# Patient Record
Sex: MALE | Race: WHITE | NOT HISPANIC OR LATINO
[De-identification: names, ages, dates, MRNs, and addresses within clinical notes are randomized per-mention and may not be internally consistent; named-entity substitution may affect disease eponyms.]

---

## 2018-04-10 ENCOUNTER — TRANSCRIPTION ENCOUNTER (OUTPATIENT)
Age: 38
End: 2018-04-10

## 2019-04-02 ENCOUNTER — APPOINTMENT (OUTPATIENT)
Dept: INTERNAL MEDICINE | Facility: CLINIC | Age: 39
End: 2019-04-02
Payer: COMMERCIAL

## 2019-04-02 ENCOUNTER — NON-APPOINTMENT (OUTPATIENT)
Age: 39
End: 2019-04-02

## 2019-04-02 VITALS
WEIGHT: 173 LBS | SYSTOLIC BLOOD PRESSURE: 140 MMHG | BODY MASS INDEX: 28.82 KG/M2 | HEIGHT: 65 IN | RESPIRATION RATE: 15 BRPM | TEMPERATURE: 98.1 F | OXYGEN SATURATION: 98 % | HEART RATE: 77 BPM | DIASTOLIC BLOOD PRESSURE: 80 MMHG

## 2019-04-02 DIAGNOSIS — F19.90 OTHER PSYCHOACTIVE SUBSTANCE USE, UNSPECIFIED, UNCOMPLICATED: ICD-10-CM

## 2019-04-02 DIAGNOSIS — Z80.1 FAMILY HISTORY OF MALIGNANT NEOPLASM OF TRACHEA, BRONCHUS AND LUNG: ICD-10-CM

## 2019-04-02 DIAGNOSIS — F10.10 ALCOHOL ABUSE, UNCOMPLICATED: ICD-10-CM

## 2019-04-02 DIAGNOSIS — F19.10 OTHER PSYCHOACTIVE SUBSTANCE ABUSE, UNCOMPLICATED: ICD-10-CM

## 2019-04-02 DIAGNOSIS — Z80.8 FAMILY HISTORY OF MALIGNANT NEOPLASM OF OTHER ORGANS OR SYSTEMS: ICD-10-CM

## 2019-04-02 DIAGNOSIS — Z80.0 FAMILY HISTORY OF MALIGNANT NEOPLASM OF DIGESTIVE ORGANS: ICD-10-CM

## 2019-04-02 DIAGNOSIS — Z82.49 FAMILY HISTORY OF ISCHEMIC HEART DISEASE AND OTHER DISEASES OF THE CIRCULATORY SYSTEM: ICD-10-CM

## 2019-04-02 PROCEDURE — 99385 PREV VISIT NEW AGE 18-39: CPT | Mod: 25

## 2019-04-02 PROCEDURE — 93000 ELECTROCARDIOGRAM COMPLETE: CPT

## 2019-04-02 PROCEDURE — 96127 BRIEF EMOTIONAL/BEHAV ASSMT: CPT

## 2019-04-02 PROCEDURE — 99214 OFFICE O/P EST MOD 30 MIN: CPT | Mod: 25

## 2019-04-06 PROBLEM — F10.10 ALCOHOL ABUSE: Status: RESOLVED | Noted: 2019-04-06 | Resolved: 2019-04-06

## 2019-04-06 PROBLEM — F19.90 ILLICIT DRUG USE: Status: RESOLVED | Noted: 2019-04-06 | Resolved: 2019-04-06

## 2019-04-06 NOTE — COUNSELING
[Weight management counseling provided] : Weight management [Healthy eating counseling provided] : healthy eating [Behavioral health counseling provided] : behavioral health  [Good understanding] : Patient has a good understanding of lifestyle changes and the steps needed to achieve self management goals [ - Annual Depression Screening] : Annual Depression Screening

## 2019-04-06 NOTE — HISTORY OF PRESENT ILLNESS
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  He states he has been clean for 9 years.  He states previously used opiates IV and drank alcohol excessively.  He still goes to \par \par Two weeks ago were mountain biking and thought he was having a heart attack.  he states he developed SOB and palpitations.  He states he thought he was going to pass out.  He states he did not seek medical attention at that time.  He states the next day he develops a little left chest pain.  He states he went to work in [facility].  He went to [facility] [place] [facility] as chest pain did not go away.  He states chest pain was like a tightness.  He states he thinks he got anxiety.  He states labs and EKG done at [facility]. He states he was told his cholesterol was high.  he states he was advised to see cardiologist.  He has appt to see cardiologist.  He states he feels better now.  he had lab results on his phone which he was showing me\par \par he also reports he has been having some left ear discomfort.  he states he has hx of recurrent ear infections and thinks he is getting one.  he denies fever/chills.  No sore throat, runny nose, or cough.  No hearing loss\par \par he also reports he has heartburn.  he is not taking anything for this

## 2019-04-06 NOTE — HEALTH RISK ASSESSMENT
[Intercurrent Urgi Care visits] : went to urgent care [0] : 2) Feeling down, depressed, or hopeless: Not at all (0) [HIV test declined] : HIV test declined [Employed] : employed [] :  [] : No [[id number]] : 0 [FreeTextEntry2] : -local 79

## 2019-04-06 NOTE — PHYSICAL EXAM
[No Acute Distress] : no acute distress [Well Nourished] : well nourished [Well Developed] : well developed [Well-Appearing] : well-appearing [Normal Voice/Communication] : normal voice/communication [Normal Sclera/Conjunctiva] : normal sclera/conjunctiva [PERRL] : pupils equal round and reactive to light [EOMI] : extraocular movements intact [Normal Outer Ear/Nose] : the outer ears and nose were normal in appearance [Normal Oropharynx] : the oropharynx was normal [Normal Nasal Mucosa] : the nasal mucosa was normal [No JVD] : no jugular venous distention [Supple] : supple [No Lymphadenopathy] : no lymphadenopathy [Thyroid Normal, No Nodules] : the thyroid was normal and there were no nodules present [No Respiratory Distress] : no respiratory distress  [Clear to Auscultation] : lungs were clear to auscultation bilaterally [No Accessory Muscle Use] : no accessory muscle use [Normal Rate] : normal rate  [Regular Rhythm] : with a regular rhythm [Normal S1, S2] : normal S1 and S2 [No Murmur] : no murmur heard [No Carotid Bruits] : no carotid bruits [No Edema] : there was no peripheral edema [No Extremity Clubbing/Cyanosis] : no extremity clubbing/cyanosis [Soft] : abdomen soft [Non Tender] : non-tender [Non-distended] : non-distended [No Masses] : no abdominal mass palpated [No HSM] : no HSM [Normal Bowel Sounds] : normal bowel sounds [No CVA Tenderness] : no CVA  tenderness [No Spinal Tenderness] : no spinal tenderness [No Joint Swelling] : no joint swelling [No Rash] : no rash [No Skin Lesions] : no skin lesions [No Focal Deficits] : no focal deficits [Normal Affect] : the affect was normal [Normal Mood] : the mood was normal [Normal Insight/Judgement] : insight and judgment were intact [de-identified] : left TM mildly erythematous, right TM intact

## 2019-04-06 NOTE — REVIEW OF SYSTEMS
[Anxiety] : anxiety [Negative] : Neurological [Fever] : no fever [Chills] : no chills [Fatigue] : no fatigue [Recent Change In Weight] : ~T no recent weight change [Earache] : no earache [Nasal Discharge] : no nasal discharge [Sore Throat] : no sore throat [Abdominal Pain] : no abdominal pain [Nausea] : no nausea [Diarrhea] : no diarrhea [Vomiting] : no vomiting [Suicidal] : not suicidal [Insomnia] : no insomnia [Depression] : no depression [FreeTextEntry5] : see HPI

## 2019-04-06 NOTE — ASSESSMENT
[FreeTextEntry1] : \par Chest pain/palpitations/SOB:\par -may be related to anxiety.  GERD could be contributing to chest pain as well\par -EKG today NSR at 68, no ST abnormalities\par -I have advised he see cardiology\par -will check CXR\par -if he develops chest pain or shortness of breath he should call 911 and go to ER\par \par Left otitis media:\par -will tx with Augmentin 875mg Po BID x 7 days\par -he is to notify office if sx persist or worsen\par \par GERD:\par -will check labs\par -sx are mild for now\par \par Dyslipidemia:\par -noted on labs here had done at urgent care-unclear if they were done fasting\par -chol 203 and trig 325\par -I advised low fat/low cholesterol diet and weight loss\par \par Elevated alk phos:\par -will repeat labs\par - with urgent care labs\par -he states this was a known finidng in past and worked up by previous PMD in [place]\par \par Hx of dug us including IVDA and alcohol abuse hx\par -he has been clean for 9 years\par -he continues to go to AA meeting\par \par Overweight:\par -BMI 28\par -I advised low fat/low cholesterol diet and weight loss\par -if sx persist may consider PPI\par \par Anxiety:\par -may be contributing to chest pain\par -tx options discussed\par -as sx mild will monitor for now\par -PHQ 9 score\par -if sx persist may consider referral to BH or therapist\par -no SI/HI\par -f/u 6 weeks\par \par Elevated BP:\par -I advised low fat/low cholesterol diet, low salt diet, and weight loss\par -f/u 6 weeks for BP check\par \par HCM\par \par CPE 2019\par \par Depression screenin2019 PHQ 2 score 0\par \par EKG 2019\par \par Flu shot:he declined\par \par Tdap: approx  per pt\par \par HIV testing: offered  2019 he declined\par \par Hepatitis C screening: ordered today\par \par Labs ordered-he will have done at lab\par \par Will try to get records and lab reports from his urgent care visit and will try to get records from previous PMD\par \par F/U 6 weeks\par \par \par

## 2019-05-10 LAB
25(OH)D3 SERPL-MCNC: 23.4 NG/ML
ANION GAP SERPL CALC-SCNC: 12 MMOL/L
APPEARANCE: CLEAR
BACTERIA: NEGATIVE
BILIRUBIN URINE: NEGATIVE
BLOOD URINE: NEGATIVE
BUN SERPL-MCNC: 17 MG/DL
CALCIUM SERPL-MCNC: 9.6 MG/DL
CHLORIDE SERPL-SCNC: 102 MMOL/L
CHOLEST SERPL-MCNC: 194 MG/DL
CHOLEST/HDLC SERPL: 3.5 RATIO
CO2 SERPL-SCNC: 26 MMOL/L
COLOR: NORMAL
CREAT SERPL-MCNC: 1.15 MG/DL
ERYTHROCYTE [SEDIMENTATION RATE] IN BLOOD BY WESTERGREN METHOD: 12 MM/HR
ESTIMATED AVERAGE GLUCOSE: 111 MG/DL
GGT SERPL-CCNC: 25 U/L
GLUCOSE QUALITATIVE U: NEGATIVE
GLUCOSE SERPL-MCNC: 93 MG/DL
H PYLORI AB SER-ACNC: 12.5 UNITS
H PYLORI IGA SER-ACNC: 29.6 UNITS
HBA1C MFR BLD HPLC: 5.5 %
HBV CORE IGG+IGM SER QL: NONREACTIVE
HBV SURFACE AB SER QL: NONREACTIVE
HBV SURFACE AG SER QL: NONREACTIVE
HCV AB SER QL: NONREACTIVE
HCV S/CO RATIO: 0.11 S/CO
HDLC SERPL-MCNC: 55 MG/DL
HYALINE CASTS: 0 /LPF
KETONES URINE: NEGATIVE
LDLC SERPL CALC-MCNC: 129 MG/DL
LEUKOCYTE ESTERASE URINE: NEGATIVE
MICROSCOPIC-UA: NORMAL
NITRITE URINE: NEGATIVE
PH URINE: 6
POTASSIUM SERPL-SCNC: 4.4 MMOL/L
PROTEIN URINE: NEGATIVE
RED BLOOD CELLS URINE: 1 /HPF
SODIUM SERPL-SCNC: 140 MMOL/L
SPECIFIC GRAVITY URINE: 1.01
SQUAMOUS EPITHELIAL CELLS: 0 /HPF
TRIGL SERPL-MCNC: 52 MG/DL
TSH SERPL-ACNC: 0.76 UIU/ML
UROBILINOGEN URINE: NORMAL
WHITE BLOOD CELLS URINE: 0 /HPF

## 2019-05-13 ENCOUNTER — FORM ENCOUNTER (OUTPATIENT)
Age: 39
End: 2019-05-13

## 2019-05-14 ENCOUNTER — OUTPATIENT (OUTPATIENT)
Dept: OUTPATIENT SERVICES | Facility: HOSPITAL | Age: 39
LOS: 1 days | End: 2019-05-14
Payer: MEDICARE

## 2019-05-14 ENCOUNTER — TRANSCRIPTION ENCOUNTER (OUTPATIENT)
Age: 39
End: 2019-05-14

## 2019-05-14 ENCOUNTER — APPOINTMENT (OUTPATIENT)
Dept: RADIOLOGY | Facility: CLINIC | Age: 39
End: 2019-05-14
Payer: COMMERCIAL

## 2019-05-14 DIAGNOSIS — R06.02 SHORTNESS OF BREATH: ICD-10-CM

## 2019-05-14 PROCEDURE — 71046 X-RAY EXAM CHEST 2 VIEWS: CPT | Mod: 26

## 2019-05-14 PROCEDURE — 71046 X-RAY EXAM CHEST 2 VIEWS: CPT

## 2019-05-15 ENCOUNTER — APPOINTMENT (OUTPATIENT)
Dept: INTERNAL MEDICINE | Facility: CLINIC | Age: 39
End: 2019-05-15
Payer: COMMERCIAL

## 2019-05-15 ENCOUNTER — RESULT CHARGE (OUTPATIENT)
Age: 39
End: 2019-05-15

## 2019-05-15 VITALS
WEIGHT: 169 LBS | HEIGHT: 65 IN | SYSTOLIC BLOOD PRESSURE: 118 MMHG | BODY MASS INDEX: 28.16 KG/M2 | OXYGEN SATURATION: 98 % | TEMPERATURE: 97.8 F | DIASTOLIC BLOOD PRESSURE: 80 MMHG | HEART RATE: 85 BPM | RESPIRATION RATE: 15 BRPM

## 2019-05-15 PROCEDURE — 99214 OFFICE O/P EST MOD 30 MIN: CPT

## 2019-05-15 RX ORDER — AMOXICILLIN AND CLAVULANATE POTASSIUM 875; 125 MG/1; MG/1
875-125 TABLET, COATED ORAL
Qty: 14 | Refills: 0 | Status: DISCONTINUED | COMMUNITY
Start: 2019-04-02 | End: 2019-05-15

## 2019-05-15 NOTE — ASSESSMENT
[FreeTextEntry1] : \par Chest pain/palpitations/SOB:\par -he remains asymptomatic\par -he was seen by cardiology and work up underway\par - CXR was normal \par \par GERD:\par -H pylori serology was positive\par -will order H. pylori breath test \par -not on any meds\par \par Dyslipidemia:\par -recent lipids reviewed with pt\par -I advised low fat/low cholesterol diet and weight loss\par \par Elevated alk phos:\par - with urgent care labs\par -GGT was normal\par -hepatitis B and C serologies were negative\par -he states this was a known finding in past and worked up by previous PMD in [place]\par -will repeat hepatic function panel and AP isoenzymes in 6 weeks-he will go to lab\par \par Overweight:\par -he has lost 4 lbs with diet modification\par -I advised low fat/low cholesterol diet and weight loss\par \par Anxiety:\par -he states sx have resolved and he feels well\par -f/u 6 weeks\par \par Vitamin D insufficiency:\par -I advised OTC vitamin D 3 1000 units daily\par \par HCM\par \par CPE 2019\par \par Depression screenin/15/2019 PHQ 2 score 0\par \par EKG 2019\par \par Flu shot: he declined previously\par \par Tdap: approx  per pt\par \par HIV testing: offered 2019 he declined\par \par Hepatitis C screenin2019 negative\par \par F/U 6 months.  he will have repeat labs done at lab in 6 weeks\par \par \par

## 2019-05-15 NOTE — REVIEW OF SYSTEMS
[Recent Change In Weight] : ~T recent weight change [Negative] : Integumentary [Chills] : no chills [Fever] : no fever [Fatigue] : no fatigue [Earache] : no earache [Nasal Discharge] : no nasal discharge [Sore Throat] : no sore throat [Chest Pain] : no chest pain [Palpitations] : no palpitations [Lower Ext Edema] : no lower extremity edema [Shortness Of Breath] : no shortness of breath [Wheezing] : no wheezing [Cough] : no cough [Dyspnea on Exertion] : not dyspnea on exertion [Abdominal Pain] : no abdominal pain [Nausea] : no nausea [Diarrhea] : no diarrhea [Vomiting] : no vomiting [Anxiety] : no anxiety [Depression] : no depression [FreeTextEntry2] : lost 4 Ibs

## 2019-05-15 NOTE — END OF VISIT
[FreeTextEntry3] : All medical entries made by the Scribe were at my, Dr. Tristan Caicedo's, direction and personally dictated by me on [5/15/19]. I have reviewed the chart and agree that the record accurately reflects my personal performance of the history, physical exam, assessment and plan. I have also personally directed, reviewed, and agreed with the chart.\par  [>50% of Time Spent on Counseling for ____] : Greater than 50% of the encounter time was spent on counseling for [unfilled] [Time Spent: ___ minutes] : I have spent [unfilled] minutes of face to face time with the patient

## 2019-05-15 NOTE — PHYSICAL EXAM
[No Acute Distress] : no acute distress [Well-Appearing] : well-appearing [Normal Sclera/Conjunctiva] : normal sclera/conjunctiva [Normal Voice/Communication] : normal voice/communication [PERRL] : pupils equal round and reactive to light [EOMI] : extraocular movements intact [Normal Oropharynx] : the oropharynx was normal [No JVD] : no jugular venous distention [Supple] : supple [No Lymphadenopathy] : no lymphadenopathy [Thyroid Normal, No Nodules] : the thyroid was normal and there were no nodules present [No Respiratory Distress] : no respiratory distress  [Clear to Auscultation] : lungs were clear to auscultation bilaterally [No Accessory Muscle Use] : no accessory muscle use [Regular Rhythm] : with a regular rhythm [Normal Rate] : normal rate  [Normal S1, S2] : normal S1 and S2 [No Murmur] : no murmur heard [No Extremity Clubbing/Cyanosis] : no extremity clubbing/cyanosis [No Edema] : there was no peripheral edema [Soft] : abdomen soft [Non Tender] : non-tender [No Masses] : no abdominal mass palpated [No HSM] : no HSM [Non-distended] : non-distended [Normal Bowel Sounds] : normal bowel sounds [No Rash] : no rash [No Focal Deficits] : no focal deficits [Normal Affect] : the affect was normal [Normal Mood] : the mood was normal [Normal Insight/Judgement] : insight and judgment were intact [Well Nourished] : well nourished [Well Developed] : well developed [Alert and Oriented x3] : oriented to person, place, and time

## 2019-05-15 NOTE — HISTORY OF PRESENT ILLNESS
[FreeTextEntry1] : Here for lab results [de-identified] : Here for lab results \par \par He states that his anxiety is well controlled. \par \par He was seen by cardiologist Dr. Toney and a stress test and echocardiogram were ordered.  He states he already did Holter\par \par He states that he has been watching his diet and has been eating much healthier and has lost 4 lbs

## 2019-05-15 NOTE — ADDENDUM
[FreeTextEntry1] : I, Lindsey Coffey, acted solely as a scribe for Dr. Caicedo on this date [5/15/19].\par

## 2019-11-13 ENCOUNTER — APPOINTMENT (OUTPATIENT)
Dept: INTERNAL MEDICINE | Facility: CLINIC | Age: 39
End: 2019-11-13

## 2019-12-10 ENCOUNTER — TRANSCRIPTION ENCOUNTER (OUTPATIENT)
Age: 39
End: 2019-12-10

## 2019-12-16 ENCOUNTER — NON-APPOINTMENT (OUTPATIENT)
Age: 39
End: 2019-12-16

## 2019-12-16 ENCOUNTER — APPOINTMENT (OUTPATIENT)
Dept: INTERNAL MEDICINE | Facility: CLINIC | Age: 39
End: 2019-12-16
Payer: COMMERCIAL

## 2019-12-16 VITALS
WEIGHT: 177 LBS | SYSTOLIC BLOOD PRESSURE: 123 MMHG | HEART RATE: 110 BPM | HEIGHT: 65 IN | TEMPERATURE: 97.6 F | BODY MASS INDEX: 29.49 KG/M2 | DIASTOLIC BLOOD PRESSURE: 74 MMHG | OXYGEN SATURATION: 98 %

## 2019-12-16 DIAGNOSIS — B02.9 ZOSTER W/OUT COMPLICATIONS: ICD-10-CM

## 2019-12-16 DIAGNOSIS — Z87.898 PERSONAL HISTORY OF OTHER SPECIFIED CONDITIONS: ICD-10-CM

## 2019-12-16 DIAGNOSIS — R03.0 ELEVATED BLOOD-PRESSURE READING, W/OUT DIAGNOSIS OF HYPERTENSION: ICD-10-CM

## 2019-12-16 PROCEDURE — 93000 ELECTROCARDIOGRAM COMPLETE: CPT

## 2019-12-16 PROCEDURE — 36415 COLL VENOUS BLD VENIPUNCTURE: CPT

## 2019-12-16 PROCEDURE — 99214 OFFICE O/P EST MOD 30 MIN: CPT | Mod: 25

## 2019-12-19 PROBLEM — B02.9 SHINGLES: Status: ACTIVE | Noted: 2019-12-16

## 2019-12-19 PROBLEM — Z87.898 HISTORY OF CHEST PAIN: Status: RESOLVED | Noted: 2019-04-02 | Resolved: 2019-12-19

## 2019-12-19 PROBLEM — R03.0 ELEVATED BLOOD PRESSURE READING: Status: RESOLVED | Noted: 2019-04-02 | Resolved: 2019-12-19

## 2019-12-19 PROBLEM — Z87.898 HISTORY OF SHORTNESS OF BREATH: Status: RESOLVED | Noted: 2019-04-02 | Resolved: 2019-12-19

## 2019-12-19 PROBLEM — Z87.898 HISTORY OF PALPITATIONS: Status: RESOLVED | Noted: 2019-04-02 | Resolved: 2019-12-19

## 2019-12-19 NOTE — REVIEW OF SYSTEMS
[Negative] : Psychiatric [Fever] : no fever [Chills] : no chills [Fatigue] : no fatigue [Chest Pain] : no chest pain [Lower Ext Edema] : no lower extremity edema [Palpitations] : no palpitations [Wheezing] : no wheezing [Cough] : no cough [Shortness Of Breath] : no shortness of breath [Dyspnea on Exertion] : not dyspnea on exertion [Abdominal Pain] : no abdominal pain [Nausea] : no nausea [Diarrhea] : no diarrhea [Vomiting] : no vomiting [de-identified] : see HPI [de-identified] : See HPI

## 2019-12-19 NOTE — HISTORY OF PRESENT ILLNESS
[FreeTextEntry8] : He states that he was dx with shingles at urgent care on 12/9/2019. He was treated with valacyclovir x7 days. He states that he began having  symptoms on  thanksgiving day. That following Friday he developed a rash around his right rib and flank area. He went to an urgent care in the city and was told it was nothing. But as sx persisted, he went to a different urgent care the following week and was dx with shingles. He states that medication made him feel dizzy but currently denies feeling dizzy. He also notes that symptoms have significantly improved.

## 2019-12-19 NOTE — END OF VISIT
[FreeTextEntry3] : All medical entries made by the Scribe were at my, Dr. Tristan Caicedo's, direction and personally dictated by me on [12/16/2019]. I have reviewed the chart and agree that the record accurately reflects my personal performance of the history, physical exam, assessment and plan. I have also personally directed, reviewed, and agreed with the chart.\par

## 2019-12-19 NOTE — PHYSICAL EXAM
[EOMI] : extraocular movements intact [No JVD] : no jugular venous distention [Thyroid Normal, No Nodules] : the thyroid was normal and there were no nodules present [No Lymphadenopathy] : no lymphadenopathy [No Respiratory Distress] : no respiratory distress  [Supple] : supple [No Accessory Muscle Use] : no accessory muscle use [Normal Rate] : normal rate  [Clear to Auscultation] : lungs were clear to auscultation bilaterally [No Murmur] : no murmur heard [Regular Rhythm] : with a regular rhythm [Normal S1, S2] : normal S1 and S2 [Well Developed] : well developed [Well Nourished] : well nourished [No Acute Distress] : no acute distress [Normal Voice/Communication] : normal voice/communication [Normal Sclera/Conjunctiva] : normal sclera/conjunctiva [Well-Appearing] : well-appearing [Normal Oropharynx] : the oropharynx was normal [PERRL] : pupils equal round and reactive to light [Normal] : no respiratory distress, lungs were clear to auscultation bilaterally and no accessory muscle use [Soft] : abdomen soft [No Extremity Clubbing/Cyanosis] : no extremity clubbing/cyanosis [No Edema] : there was no peripheral edema [No Masses] : no abdominal mass palpated [Non-distended] : non-distended [Non Tender] : non-tender [No HSM] : no HSM [Normal Bowel Sounds] : normal bowel sounds [No Focal Deficits] : no focal deficits [Normal Affect] : the affect was normal [Normal Mood] : the mood was normal [Normal Insight/Judgement] : insight and judgment were intact [Alert and Oriented x3] : oriented to person, place, and time [No CVA Tenderness] : no CVA  tenderness [No Rash] : no rash [No Spinal Tenderness] : no spinal tenderness [No Skin Lesions] : no skin lesions

## 2019-12-19 NOTE — ADDENDUM
[FreeTextEntry1] : I, Lindsey Coffey, acted solely as a scribe for Dr. Caicedo on this date [12/16/2019].\par

## 2019-12-19 NOTE — ASSESSMENT
[FreeTextEntry1] : \par Shingles:\par -s/o course of valtrex\par -overall feeling better\par -rash has resolved \par -pain  has mostly resolved as well\par \par Dizziness:\par -asymptomatic currently\par -EKG today showed NSR at 98, LAE, no ST abnormalities\par -will check labs\par \par Chest pain/palpitations/SOB:\par -he remains asymptomatic\par -he states work up with cardiologist was negative\par \par GERD:\par -H pylori serology was positive\par -will order H. pylori breath test again \par -not on any meds\par \par Dyslipidemia:\par -I advised low fat/low cholesterol diet and weight loss\par \par Elevated alk phos:\par -GGT was normal\par -hepatitis B and C serologies were negative\par -he states this was a known finding in past and worked up by previous PMD in [place]\par -will repeat hepatic function panel and AP isoenzymes in 6 weeks\par \par Vitamin D insufficiency:\par -OTC vitamin D 3 1000 units zuly\par -will check vitamin D 25-OHly\par \par HCM\par \par CPE 2019\par \par Depression screenin/15/2019 PHQ 2 score 0\par \par EKG 2019\par \par Flu shot: he declined 2019\par \par Tdap: approx  per pt\par \par HIV testing: offered 2019 he declined\par \par Hepatitis C screenin2019 negative\par \par F/U 3 months.  Labs drawn in office today.\par \par \par \par

## 2019-12-22 LAB
25(OH)D3 SERPL-MCNC: 26.2 NG/ML
ALBUMIN SERPL ELPH-MCNC: 4.4 G/DL
ALKPISO INTERP: NORMAL
ALP BLD-CCNC: 219 U/L
ALP BLD-CCNC: 220 U/L
ALP BONE CFR SERPL: 35 %
ALP INTEST CFR SERPL: 45 %
ALP LIVER CFR SERPL: 20 %
ALP MACRO CFR SERPL: 0 %
ALP PLAC CFR SERPL: 0 %
ALT SERPL-CCNC: 31 U/L
ANION GAP SERPL CALC-SCNC: 11 MMOL/L
AST SERPL-CCNC: 24 U/L
BASOPHILS # BLD AUTO: 0.03 K/UL
BASOPHILS NFR BLD AUTO: 0.5 %
BILIRUB DIRECT SERPL-MCNC: 0.1 MG/DL
BILIRUB INDIRECT SERPL-MCNC: 0.2 MG/DL
BILIRUB SERPL-MCNC: 0.2 MG/DL
BUN SERPL-MCNC: 22 MG/DL
CALCIUM SERPL-MCNC: 9.5 MG/DL
CHLORIDE SERPL-SCNC: 103 MMOL/L
CO2 SERPL-SCNC: 25 MMOL/L
CREAT SERPL-MCNC: 1.3 MG/DL
EOSINOPHIL # BLD AUTO: 0.1 K/UL
EOSINOPHIL NFR BLD AUTO: 1.6 %
GLUCOSE SERPL-MCNC: 100 MG/DL
HCT VFR BLD CALC: 45.8 %
HGB BLD-MCNC: 15.6 G/DL
IMM GRANULOCYTES NFR BLD AUTO: 0.2 %
LYMPHOCYTES # BLD AUTO: 2.31 K/UL
LYMPHOCYTES NFR BLD AUTO: 36.6 %
MAN DIFF?: NORMAL
MCHC RBC-ENTMCNC: 30.2 PG
MCHC RBC-ENTMCNC: 34.1 GM/DL
MCV RBC AUTO: 88.8 FL
MONOCYTES # BLD AUTO: 0.45 K/UL
MONOCYTES NFR BLD AUTO: 7.1 %
NEUTROPHILS # BLD AUTO: 3.41 K/UL
NEUTROPHILS NFR BLD AUTO: 54 %
PLATELET # BLD AUTO: 189 K/UL
POTASSIUM SERPL-SCNC: 4.6 MMOL/L
PROT SERPL-MCNC: 6.9 G/DL
RBC # BLD: 5.16 M/UL
RBC # FLD: 12.3 %
SODIUM SERPL-SCNC: 139 MMOL/L
WBC # FLD AUTO: 6.31 K/UL

## 2019-12-27 ENCOUNTER — TRANSCRIPTION ENCOUNTER (OUTPATIENT)
Age: 39
End: 2019-12-27

## 2019-12-28 ENCOUNTER — EMERGENCY (EMERGENCY)
Facility: HOSPITAL | Age: 39
LOS: 1 days | Discharge: HOME | End: 2019-12-28
Attending: EMERGENCY MEDICINE
Payer: COMMERCIAL

## 2019-12-28 VITALS
SYSTOLIC BLOOD PRESSURE: 200 MMHG | TEMPERATURE: 98 F | DIASTOLIC BLOOD PRESSURE: 120 MMHG | RESPIRATION RATE: 20 BRPM | OXYGEN SATURATION: 98 % | HEART RATE: 102 BPM | WEIGHT: 154.98 LBS | HEIGHT: 67 IN

## 2019-12-28 VITALS
OXYGEN SATURATION: 98 % | HEART RATE: 99 BPM | SYSTOLIC BLOOD PRESSURE: 138 MMHG | DIASTOLIC BLOOD PRESSURE: 91 MMHG | RESPIRATION RATE: 16 BRPM

## 2019-12-28 LAB
BASOPHILS # BLD AUTO: 0.03 K/UL — SIGNIFICANT CHANGE UP (ref 0–0.2)
BASOPHILS NFR BLD AUTO: 0.3 % — SIGNIFICANT CHANGE UP (ref 0–2)
D DIMER BLD IA.RAPID-MCNC: <150 NG/ML DDU — SIGNIFICANT CHANGE UP
EOSINOPHIL # BLD AUTO: 0.06 K/UL — SIGNIFICANT CHANGE UP (ref 0–0.5)
EOSINOPHIL NFR BLD AUTO: 0.6 % — SIGNIFICANT CHANGE UP (ref 0–6)
HCT VFR BLD CALC: 44.2 % — SIGNIFICANT CHANGE UP (ref 39–50)
HGB BLD-MCNC: 15.1 G/DL — SIGNIFICANT CHANGE UP (ref 13–17)
IMM GRANULOCYTES NFR BLD AUTO: 0.2 % — SIGNIFICANT CHANGE UP (ref 0–1.5)
LYMPHOCYTES # BLD AUTO: 2.1 K/UL — SIGNIFICANT CHANGE UP (ref 1–3.3)
LYMPHOCYTES # BLD AUTO: 21.8 % — SIGNIFICANT CHANGE UP (ref 13–44)
MCHC RBC-ENTMCNC: 29.9 PG — SIGNIFICANT CHANGE UP (ref 27–34)
MCHC RBC-ENTMCNC: 34.2 GM/DL — SIGNIFICANT CHANGE UP (ref 32–36)
MCV RBC AUTO: 87.5 FL — SIGNIFICANT CHANGE UP (ref 80–100)
MONOCYTES # BLD AUTO: 0.61 K/UL — SIGNIFICANT CHANGE UP (ref 0–0.9)
MONOCYTES NFR BLD AUTO: 6.3 % — SIGNIFICANT CHANGE UP (ref 2–14)
NEUTROPHILS # BLD AUTO: 6.81 K/UL — SIGNIFICANT CHANGE UP (ref 1.8–7.4)
NEUTROPHILS NFR BLD AUTO: 70.8 % — SIGNIFICANT CHANGE UP (ref 43–77)
PLATELET # BLD AUTO: 200 K/UL — SIGNIFICANT CHANGE UP (ref 150–400)
RBC # BLD: 5.05 M/UL — SIGNIFICANT CHANGE UP (ref 4.2–5.8)
RBC # FLD: 12.4 % — SIGNIFICANT CHANGE UP (ref 10.3–14.5)
WBC # BLD: 9.63 K/UL — SIGNIFICANT CHANGE UP (ref 3.8–10.5)
WBC # FLD AUTO: 9.63 K/UL — SIGNIFICANT CHANGE UP (ref 3.8–10.5)

## 2019-12-28 PROCEDURE — 99284 EMERGENCY DEPT VISIT MOD MDM: CPT

## 2019-12-28 RX ORDER — SODIUM CHLORIDE 9 MG/ML
1000 INJECTION INTRAMUSCULAR; INTRAVENOUS; SUBCUTANEOUS ONCE
Refills: 0 | Status: COMPLETED | OUTPATIENT
Start: 2019-12-28 | End: 2019-12-28

## 2019-12-28 RX ORDER — HYDROXYZINE HCL 10 MG
25 TABLET ORAL ONCE
Refills: 0 | Status: COMPLETED | OUTPATIENT
Start: 2019-12-28 | End: 2019-12-28

## 2019-12-28 RX ADMIN — Medication 25 MILLIGRAM(S): at 23:34

## 2019-12-28 RX ADMIN — SODIUM CHLORIDE 1000 MILLILITER(S): 9 INJECTION INTRAMUSCULAR; INTRAVENOUS; SUBCUTANEOUS at 23:40

## 2019-12-28 NOTE — ED ADULT TRIAGE NOTE - CHIEF COMPLAINT QUOTE
went to bed with odd feeling in chest. awoke with palpitations and dizziness. as per EMS found to be in new onset a-fib

## 2019-12-28 NOTE — ED PROVIDER NOTE - CLINICAL SUMMARY MEDICAL DECISION MAKING FREE TEXT BOX
Pt has remained NSR in 80s to 90s here. he was noted to have an irregaulr and rapid heart rate by EMS, however upon arrival and on Eklg pt just had a marked sinus arrhyhtmia, which is likely what they observed. he is well appearing and has good cardio f/u at [facility]

## 2019-12-28 NOTE — ED PROVIDER NOTE - PATIENT PORTAL LINK FT
You can access the FollowMyHealth Patient Portal offered by [facility] by registering at the following website: [email or web address]. By joining [facility]’s FollowMyHealth portal, you will also be able to view your health information using other applications (apps) compatible with our system.

## 2019-12-28 NOTE — ED PROVIDER NOTE - OBJECTIVE STATEMENT
38 y/o M, with Hx of anxiety, presents to the ED c/o rapid palpitations that began tonight. Pt reports that he was sleeping when he woke up abruptly and felt palpitations, stating that is heart was "going crazy" and "getting crazier" despite pt trying to relax. Pt also reports chest tightness, but denies CP. No further acute complaints at this time.

## 2019-12-29 LAB
ALBUMIN SERPL ELPH-MCNC: 4 G/DL — SIGNIFICANT CHANGE UP (ref 3.3–5.2)
ALP SERPL-CCNC: 199 U/L — HIGH (ref 40–120)
ALT FLD-CCNC: 27 U/L — SIGNIFICANT CHANGE UP
ANION GAP SERPL CALC-SCNC: 11 MMOL/L — SIGNIFICANT CHANGE UP (ref 5–17)
AST SERPL-CCNC: 22 U/L — SIGNIFICANT CHANGE UP
BILIRUB SERPL-MCNC: 0.2 MG/DL — LOW (ref 0.4–2)
BUN SERPL-MCNC: 25 MG/DL — HIGH (ref 8–20)
CALCIUM SERPL-MCNC: 8.8 MG/DL — SIGNIFICANT CHANGE UP (ref 8.6–10.2)
CHLORIDE SERPL-SCNC: 105 MMOL/L — SIGNIFICANT CHANGE UP (ref 98–107)
CO2 SERPL-SCNC: 23 MMOL/L — SIGNIFICANT CHANGE UP (ref 22–29)
CREAT SERPL-MCNC: 0.88 MG/DL — SIGNIFICANT CHANGE UP (ref 0.5–1.3)
GLUCOSE SERPL-MCNC: 119 MG/DL — HIGH (ref 70–115)
MAGNESIUM SERPL-MCNC: 1.8 MG/DL — SIGNIFICANT CHANGE UP (ref 1.6–2.6)
POTASSIUM SERPL-MCNC: 4.1 MMOL/L — SIGNIFICANT CHANGE UP (ref 3.5–5.3)
POTASSIUM SERPL-SCNC: 4.1 MMOL/L — SIGNIFICANT CHANGE UP (ref 3.5–5.3)
PROT SERPL-MCNC: 6.3 G/DL — LOW (ref 6.6–8.7)
SODIUM SERPL-SCNC: 139 MMOL/L — SIGNIFICANT CHANGE UP (ref 135–145)
T4 AB SER-ACNC: 6 UG/DL — SIGNIFICANT CHANGE UP (ref 4.5–12)
TROPONIN T SERPL-MCNC: <0.01 NG/ML — SIGNIFICANT CHANGE UP (ref 0–0.06)
TSH SERPL-MCNC: 1.1 UIU/ML — SIGNIFICANT CHANGE UP (ref 0.27–4.2)

## 2019-12-29 PROCEDURE — 84436 ASSAY OF TOTAL THYROXINE: CPT

## 2019-12-29 PROCEDURE — 85379 FIBRIN DEGRADATION QUANT: CPT

## 2019-12-29 PROCEDURE — 80053 COMPREHEN METABOLIC PANEL: CPT

## 2019-12-29 PROCEDURE — 84484 ASSAY OF TROPONIN QUANT: CPT

## 2019-12-29 PROCEDURE — 99283 EMERGENCY DEPT VISIT LOW MDM: CPT

## 2019-12-29 PROCEDURE — 85027 COMPLETE CBC AUTOMATED: CPT

## 2019-12-29 PROCEDURE — 83735 ASSAY OF MAGNESIUM: CPT

## 2019-12-29 PROCEDURE — 36415 COLL VENOUS BLD VENIPUNCTURE: CPT

## 2019-12-29 PROCEDURE — 84443 ASSAY THYROID STIM HORMONE: CPT

## 2019-12-29 RX ORDER — HYDROXYZINE HCL 10 MG
1 TABLET ORAL
Qty: 3 | Refills: 0
Start: 2019-12-29 | End: 2019-12-29

## 2019-12-30 ENCOUNTER — NON-APPOINTMENT (OUTPATIENT)
Age: 39
End: 2019-12-30

## 2019-12-30 ENCOUNTER — APPOINTMENT (OUTPATIENT)
Dept: INTERNAL MEDICINE | Facility: CLINIC | Age: 39
End: 2019-12-30
Payer: COMMERCIAL

## 2019-12-30 VITALS
BODY MASS INDEX: 29.16 KG/M2 | SYSTOLIC BLOOD PRESSURE: 128 MMHG | DIASTOLIC BLOOD PRESSURE: 88 MMHG | HEART RATE: 61 BPM | RESPIRATION RATE: 15 BRPM | WEIGHT: 175 LBS | OXYGEN SATURATION: 98 % | TEMPERATURE: 97.7 F | HEIGHT: 65 IN

## 2019-12-30 DIAGNOSIS — Z86.69 PERSONAL HISTORY OF OTHER DISEASES OF THE NERVOUS SYSTEM AND SENSE ORGANS: ICD-10-CM

## 2019-12-30 PROCEDURE — 99214 OFFICE O/P EST MOD 30 MIN: CPT | Mod: 25

## 2019-12-30 PROCEDURE — 93000 ELECTROCARDIOGRAM COMPLETE: CPT

## 2020-01-01 NOTE — ASSESSMENT
[FreeTextEntry1] : \par Otitis media:\par -will tx with Augmentin 875mg PO BID x 7 days\par -he has appt to see ENT today\par \par Atrial fibrillation:\par -labs in ER done including normal TFTs\par -appears oral steroids may have caused atrial fibrillation\par -he is currently asymptomatic\par -EKG today NSR at 98, no ST abnormalities\par -he was not discharged on any meds\par -I have advised he start ASA 81mg PO daily pendng cardiology evaluation\par -I advised he make appt to see cardiology as soon as possible\par -he was advised to go back to ER if he develops palpitations, chest pain, SOB, or dizziness\par \par Shingles:\par -sx have resolved\par \par GERD:\par -H pylori serology was positive\par -will order H. pylori breath test has been ordered\par -not on any meds\par \par Dyslipidemia:\par -I advised low fat/low cholesterol diet and weight loss\par \par Elevated alk phos:\par AP in \par -GGT was normal\par -hepatitis B and C serologies were negative\par -he states this was a known finding in past and worked up by previous PMD in [place]\par -I have ordered an abdominal US\par -I have also advised he see GI\par \par Vitamin D insufficiency:\par -OTC vitamin D 3 1000 units daily\par \par HCM\par \par CPE 2019\par \par Depression screenin/15/2019 PHQ 2 score 0\par \par EKG 2019\par \par Flu shot: he declined 2019\par \par Tdap: approx  per pt\par \par HIV testing: offered 2019 he declined\par \par Hepatitis C screenin2019 negative\par \par F/U 6 weeks\par \par \par \par

## 2020-01-01 NOTE — REVIEW OF SYSTEMS
[Earache] : earache [Nasal Discharge] : nasal discharge [Negative] : Neurological [Fever] : no fever [Chills] : no chills [Fatigue] : no fatigue [Recent Change In Weight] : ~T no recent weight change [Chest Pain] : no chest pain [Sore Throat] : no sore throat [Lower Ext Edema] : no lower extremity edema [Shortness Of Breath] : no shortness of breath [Palpitations] : no palpitations [Wheezing] : no wheezing [Dyspnea on Exertion] : not dyspnea on exertion [Cough] : no cough [Nausea] : no nausea [Diarrhea] : no diarrhea [Abdominal Pain] : no abdominal pain [FreeTextEntry4] : see HPI [Vomiting] : no vomiting

## 2020-01-01 NOTE — PHYSICAL EXAM
[No Acute Distress] : no acute distress [Well Developed] : well developed [Well Nourished] : well nourished [Normal Voice/Communication] : normal voice/communication [Well-Appearing] : well-appearing [Normal Sclera/Conjunctiva] : normal sclera/conjunctiva [PERRL] : pupils equal round and reactive to light [Normal Oropharynx] : the oropharynx was normal [Normal] : normal rate, regular rhythm, normal S1 and S2 and no murmur heard [No Extremity Clubbing/Cyanosis] : no extremity clubbing/cyanosis [No Edema] : there was no peripheral edema [Soft] : abdomen soft [Non Tender] : non-tender [No Masses] : no abdominal mass palpated [Non-distended] : non-distended [No HSM] : no HSM [Normal Bowel Sounds] : normal bowel sounds [No CVA Tenderness] : no CVA  tenderness [No Rash] : no rash [No Spinal Tenderness] : no spinal tenderness [No Skin Lesions] : no skin lesions [No Focal Deficits] : no focal deficits [Alert and Oriented x3] : oriented to person, place, and time [Normal Affect] : the affect was normal [Normal Mood] : the mood was normal [Normal Insight/Judgement] : insight and judgment were intact [Normal Outer Ear/Nose] : the outer ears and nose were normal in appearance [de-identified] : nasal mucosa is erythematous, left TM with erythema and bulging, right TM intact [de-identified] : no calf tenderness

## 2020-01-01 NOTE — HISTORY OF PRESENT ILLNESS
[de-identified] : Here for f/u s/p ER visit\par \par He was seen at urgent care 12/27/2019 for left ear pain.  He was given Cortisporin drops, medrol pack, and flonase. The next day he woke up with palpitations and felt like his heart was racing.  He went to ER via EMS and was dx with atrial fibrillation. He states while in ER he went back into sinus rhythm he had EKG and labs in ER.  He was given fluids and atarax.  He was not discharged an any medication.  He states he has appt to see ENT today.  he states he is going to make appt to see cardiology today\par \par He still feels like he has a cold and states left ear still hurts a little.  he denies fever/chills.  He thinks he needs antibiotics for his ear

## 2020-01-05 ENCOUNTER — FORM ENCOUNTER (OUTPATIENT)
Age: 40
End: 2020-01-05

## 2020-01-06 ENCOUNTER — OUTPATIENT (OUTPATIENT)
Dept: OUTPATIENT SERVICES | Facility: HOSPITAL | Age: 40
LOS: 1 days | End: 2020-01-06

## 2020-01-06 ENCOUNTER — APPOINTMENT (OUTPATIENT)
Dept: ULTRASOUND IMAGING | Facility: CLINIC | Age: 40
End: 2020-01-06
Payer: COMMERCIAL

## 2020-01-06 ENCOUNTER — LABORATORY RESULT (OUTPATIENT)
Age: 40
End: 2020-01-06

## 2020-01-06 DIAGNOSIS — R74.8 ABNORMAL LEVELS OF OTHER SERUM ENZYMES: ICD-10-CM
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
## 2020-01-06 DIAGNOSIS — H53.9 UNSPECIFIED VISUAL DISTURBANCE: ICD-10-CM

## 2020-01-06 LAB
BASOPHILS # BLD AUTO: 0.02 K/UL
BASOPHILS NFR BLD AUTO: 0.4 %
EOSINOPHIL # BLD AUTO: 0.06 K/UL
EOSINOPHIL NFR BLD AUTO: 1.2 %
HCT VFR BLD CALC: 49.4 %
HGB BLD-MCNC: 16.5 G/DL
IMM GRANULOCYTES NFR BLD AUTO: 0.2 %
LYMPHOCYTES # BLD AUTO: 1.74 K/UL
LYMPHOCYTES NFR BLD AUTO: 35.3 %
MAN DIFF?: NORMAL
MCHC RBC-ENTMCNC: 29.7 PG
MCHC RBC-ENTMCNC: 33.4 GM/DL
MCV RBC AUTO: 88.8 FL
MONOCYTES # BLD AUTO: 0.53 K/UL
MONOCYTES NFR BLD AUTO: 10.8 %
NEUTROPHILS # BLD AUTO: 2.57 K/UL
NEUTROPHILS NFR BLD AUTO: 52.1 %
PLATELET # BLD AUTO: 168 K/UL
RBC # BLD: 5.56 M/UL
RBC # FLD: 12.3 %
WBC # FLD AUTO: 4.93 K/UL

## 2020-01-06 PROCEDURE — 76700 US EXAM ABDOM COMPLETE: CPT | Mod: 26

## 2020-01-07 ENCOUNTER — RESULT REVIEW (OUTPATIENT)
Age: 40
End: 2020-01-07

## 2020-01-07 ENCOUNTER — TRANSCRIPTION ENCOUNTER (OUTPATIENT)
Age: 40
End: 2020-01-07

## 2020-01-08 LAB
ALBUMIN SERPL ELPH-MCNC: 4.5 G/DL
ALP BLD-CCNC: 156 U/L
ALT SERPL-CCNC: 24 U/L
ANION GAP SERPL CALC-SCNC: 19 MMOL/L
AST SERPL-CCNC: 28 U/L
B BURGDOR IGG+IGM SER QL IB: NORMAL
BILIRUB SERPL-MCNC: 1 MG/DL
BUN SERPL-MCNC: 13 MG/DL
CALCIUM SERPL-MCNC: 9.7 MG/DL
CHLORIDE SERPL-SCNC: 104 MMOL/L
CO2 SERPL-SCNC: 17 MMOL/L
CREAT SERPL-MCNC: 1.05 MG/DL
GLUCOSE SERPL-MCNC: 91 MG/DL
POTASSIUM SERPL-SCNC: 4.8 MMOL/L
PROT SERPL-MCNC: 6.7 G/DL
SODIUM SERPL-SCNC: 140 MMOL/L

## 2020-01-15 ENCOUNTER — FORM ENCOUNTER (OUTPATIENT)
Age: 40
End: 2020-01-15

## 2020-01-16 ENCOUNTER — APPOINTMENT (OUTPATIENT)
Dept: MRI IMAGING | Facility: CLINIC | Age: 40
End: 2020-01-16
Payer: COMMERCIAL

## 2020-01-16 ENCOUNTER — OUTPATIENT (OUTPATIENT)
Dept: OUTPATIENT SERVICES | Facility: HOSPITAL | Age: 40
LOS: 1 days | End: 2020-01-16
Payer: COMMERCIAL

## 2020-01-16 DIAGNOSIS — Z00.8 ENCOUNTER FOR OTHER GENERAL EXAMINATION: ICD-10-CM

## 2020-01-16 DIAGNOSIS — R42 DIZZINESS AND GIDDINESS: ICD-10-CM

## 2020-01-16 PROCEDURE — 70553 MRI BRAIN STEM W/O & W/DYE: CPT | Mod: 26

## 2020-01-16 PROCEDURE — 70553 MRI BRAIN STEM W/O & W/DYE: CPT

## 2020-01-16 PROCEDURE — A9585: CPT

## 2020-02-10 ENCOUNTER — NON-APPOINTMENT (OUTPATIENT)
Age: 40
End: 2020-02-10

## 2020-02-10 ENCOUNTER — APPOINTMENT (OUTPATIENT)
Dept: INTERNAL MEDICINE | Facility: CLINIC | Age: 40
End: 2020-02-10
Payer: COMMERCIAL

## 2020-02-10 VITALS
WEIGHT: 161 LBS | HEART RATE: 68 BPM | TEMPERATURE: 98 F | DIASTOLIC BLOOD PRESSURE: 90 MMHG | SYSTOLIC BLOOD PRESSURE: 130 MMHG | OXYGEN SATURATION: 98 % | HEIGHT: 65 IN | BODY MASS INDEX: 26.82 KG/M2 | RESPIRATION RATE: 14 BRPM

## 2020-02-10 DIAGNOSIS — R11.0 NAUSEA: ICD-10-CM

## 2020-02-10 DIAGNOSIS — R63.4 ABNORMAL WEIGHT LOSS: ICD-10-CM

## 2020-02-10 DIAGNOSIS — E78.1 PURE HYPERGLYCERIDEMIA: ICD-10-CM

## 2020-02-10 PROCEDURE — 99214 OFFICE O/P EST MOD 30 MIN: CPT | Mod: 25

## 2020-02-10 PROCEDURE — 93000 ELECTROCARDIOGRAM COMPLETE: CPT

## 2020-02-10 PROCEDURE — 36415 COLL VENOUS BLD VENIPUNCTURE: CPT

## 2020-02-10 RX ORDER — AMOXICILLIN AND CLAVULANATE POTASSIUM 875; 125 MG/1; MG/1
875-125 TABLET, COATED ORAL
Qty: 14 | Refills: 0 | Status: DISCONTINUED | COMMUNITY
Start: 2019-12-30 | End: 2020-02-10

## 2020-02-10 NOTE — REVIEW OF SYSTEMS
[Recent Change In Weight] : ~T recent weight change [Abdominal Pain] : abdominal pain [Nausea] : nausea [Heartburn] : heartburn [Negative] : ENT [Anxiety] : anxiety [Fatigue] : no fatigue [Fever] : no fever [Chills] : no chills [Chest Pain] : no chest pain [Palpitations] : no palpitations [Lower Ext Edema] : no lower extremity edema [Shortness Of Breath] : no shortness of breath [Wheezing] : no wheezing [Diarrhea] : no diarrhea [Dyspnea on Exertion] : not dyspnea on exertion [Cough] : no cough [Suicidal] : not suicidal [Vomiting] : no vomiting [Depression] : no depression

## 2020-02-10 NOTE — ASSESSMENT
[FreeTextEntry1] : \par Abdominal pain/GERD/weight loss:\par -will repeat labs\par -will check CT abd/pelvis\par -previous abd US was unrevealing\par -Will refer again to GI\par -I advised he increase omeprazole 40mg PO BID\par -I adv he stop using advil\par -H pylori serology was positive\par -H. pylori breath test has been ordered\par -EKG today NSR at 66 with sinus arrhythmia, no ST abnormalities\par -if abdominal pain worsens he should go to ER\par \par Elevated alk phos:\par -will repeat labs\par -hepatitis B and C serologies were negative\par -he states this was a known finding in past and worked up by previous PMD in [place]\par -abdominal US  showed fatty liver\par -I have also advised he see GI\par \par Dizziness:\par -currently being evaluated by cardiology\par -has appt to see neurology\par -MRI of brain showed punctate focus of non specific intensity in left corna radiata\par -he has appt to see Neurology\par \par Atrial fibrillation:\par -he reports cardiologist did not feel he had a fib\par -EKG today NSR at 98, no ST abnormalities\par -he is currently undergoing cardiology work up with holter and stress test\par -he was advised to go back to ER if he develops palpitations, chest pain, SOB, or dizziness\par \par Dyslipidemia:\par -I advised low fat/low cholesterol diet and weight loss\par \par Vitamin D insufficiency:\par -OTC vitamin D 3 1000 units daily\par \par Anxiety:\par -could be contributing top sx\par -he states he has started OTC CBD oil to help\par -will monitor as this may be contributing to sx\par -PHQ 2 score 0\par \par preious labs showed low CO2\par -will repeat bmp\par \par HCM\par \par CPE 2019\par \par Depression screenin/10/2020 PHQ 2 score 0\par \par EKG 2/10/2020\par \par Flu shot: he declined 2019\par \par Tdap: approx  per pt\par \par HIV testing: offered 2019 he declined\par \par Hepatitis C screenin2019 negative\par \par F/U 6 weeks\par \par \par \par

## 2020-02-10 NOTE — PHYSICAL EXAM
[No Acute Distress] : no acute distress [Well Nourished] : well nourished [Well Developed] : well developed [Normal Voice/Communication] : normal voice/communication [Well-Appearing] : well-appearing [PERRL] : pupils equal round and reactive to light [Normal Sclera/Conjunctiva] : normal sclera/conjunctiva [Normal Oropharynx] : the oropharynx was normal [Normal] : no respiratory distress, lungs were clear to auscultation bilaterally and no accessory muscle use [No Edema] : there was no peripheral edema [Soft] : abdomen soft [No Extremity Clubbing/Cyanosis] : no extremity clubbing/cyanosis [Non-distended] : non-distended [No HSM] : no HSM [No Masses] : no abdominal mass palpated [Normal Bowel Sounds] : normal bowel sounds [No CVA Tenderness] : no CVA  tenderness [No Rash] : no rash [No Spinal Tenderness] : no spinal tenderness [No Focal Deficits] : no focal deficits [Alert and Oriented x3] : oriented to person, place, and time [Normal Affect] : the affect was normal [Normal Insight/Judgement] : insight and judgment were intact [Normal Mood] : the mood was normal [de-identified] : m [de-identified] : no calf tenderness [de-identified] : mild RUQ tenderness, no rebound, no guarding

## 2020-02-10 NOTE — HISTORY OF PRESENT ILLNESS
[de-identified] : Here today for follow up\par \par He states he continues to have intermittent dizziness\par \par He states he also continues to get gas pains and discomfort after eating.  He states after he eats and lays done he feels heart beating in epigastric area.  he states omeprazole has helped with heartburn.  He reports nausea in the morning.  he states only thing that really helps is advil.  he states over last 2 weeks abdominal complaints seem to be getting worse\par \par he has lost 14 lbs unintentionally.  he did states he had GI bug about 4 weeks ago which he thinks may have contributed as well.  he does states he has started eating healthier such as giving up soda so this may be contributing\par \par he has holter monitor on currently.  he states he has nuclear stress test planned\par \par he does have some anxiety and thinks this could be contributing

## 2020-02-11 LAB
ALBUMIN SERPL ELPH-MCNC: 4.8 G/DL
ALP BLD-CCNC: 142 U/L
ALT SERPL-CCNC: 20 U/L
AMYLASE/CREAT SERPL: 55 U/L
ANION GAP SERPL CALC-SCNC: 14 MMOL/L
APPEARANCE: CLEAR
AST SERPL-CCNC: 20 U/L
BACTERIA: NEGATIVE
BASOPHILS # BLD AUTO: 0.01 K/UL
BASOPHILS NFR BLD AUTO: 0.3 %
BILIRUB DIRECT SERPL-MCNC: 0.2 MG/DL
BILIRUB INDIRECT SERPL-MCNC: 0.7 MG/DL
BILIRUB SERPL-MCNC: 0.9 MG/DL
BILIRUBIN URINE: NEGATIVE
BLOOD URINE: NEGATIVE
BUN SERPL-MCNC: 15 MG/DL
CALCIUM SERPL-MCNC: 9.6 MG/DL
CHLORIDE SERPL-SCNC: 103 MMOL/L
CO2 SERPL-SCNC: 24 MMOL/L
COLOR: NORMAL
CREAT SERPL-MCNC: 1.17 MG/DL
EOSINOPHIL # BLD AUTO: 0.12 K/UL
EOSINOPHIL NFR BLD AUTO: 3.1 %
ERYTHROCYTE [SEDIMENTATION RATE] IN BLOOD BY WESTERGREN METHOD: 10 MM/HR
GGT SERPL-CCNC: 18 U/L
GLUCOSE QUALITATIVE U: NEGATIVE
GLUCOSE SERPL-MCNC: 99 MG/DL
HCT VFR BLD CALC: 49.1 %
HGB BLD-MCNC: 16.2 G/DL
HYALINE CASTS: 1 /LPF
IMM GRANULOCYTES NFR BLD AUTO: 0.3 %
KETONES URINE: NORMAL
LEUKOCYTE ESTERASE URINE: NEGATIVE
LPL SERPL-CCNC: 29 U/L
LYMPHOCYTES # BLD AUTO: 1.37 K/UL
LYMPHOCYTES NFR BLD AUTO: 35.8 %
MAN DIFF?: NORMAL
MCHC RBC-ENTMCNC: 30.1 PG
MCHC RBC-ENTMCNC: 33 GM/DL
MCV RBC AUTO: 91.3 FL
MICROSCOPIC-UA: NORMAL
MONOCYTES # BLD AUTO: 0.34 K/UL
MONOCYTES NFR BLD AUTO: 8.9 %
NEUTROPHILS # BLD AUTO: 1.98 K/UL
NEUTROPHILS NFR BLD AUTO: 51.6 %
NITRITE URINE: NEGATIVE
PH URINE: 6
PLATELET # BLD AUTO: 173 K/UL
POTASSIUM SERPL-SCNC: 4.4 MMOL/L
PROT SERPL-MCNC: 6.9 G/DL
PROTEIN URINE: NEGATIVE
RBC # BLD: 5.38 M/UL
RBC # FLD: 12.6 %
RED BLOOD CELLS URINE: 2 /HPF
SODIUM SERPL-SCNC: 141 MMOL/L
SPECIFIC GRAVITY URINE: 1.02
SQUAMOUS EPITHELIAL CELLS: 0 /HPF
UROBILINOGEN URINE: NORMAL
WBC # FLD AUTO: 3.83 K/UL
WHITE BLOOD CELLS URINE: 1 /HPF

## 2020-02-12 ENCOUNTER — TRANSCRIPTION ENCOUNTER (OUTPATIENT)
Age: 40
End: 2020-02-12

## 2020-02-12 ENCOUNTER — FORM ENCOUNTER (OUTPATIENT)
Age: 40
End: 2020-02-12

## 2020-02-13 ENCOUNTER — APPOINTMENT (OUTPATIENT)
Dept: CT IMAGING | Facility: CLINIC | Age: 40
End: 2020-02-13
Payer: COMMERCIAL

## 2020-02-13 ENCOUNTER — OUTPATIENT (OUTPATIENT)
Dept: OUTPATIENT SERVICES | Facility: HOSPITAL | Age: 40
LOS: 1 days | End: 2020-02-13
Payer: COMMERCIAL

## 2020-02-13 DIAGNOSIS — R63.4 ABNORMAL WEIGHT LOSS: ICD-10-CM

## 2020-02-13 DIAGNOSIS — R10.9 UNSPECIFIED ABDOMINAL PAIN: ICD-10-CM

## 2020-02-13 DIAGNOSIS — Z00.00 ENCOUNTER FOR GENERAL ADULT MEDICAL EXAMINATION WITHOUT ABNORMAL FINDINGS: ICD-10-CM

## 2020-02-13 PROCEDURE — 74177 CT ABD & PELVIS W/CONTRAST: CPT | Mod: 26

## 2020-02-13 PROCEDURE — 74177 CT ABD & PELVIS W/CONTRAST: CPT

## 2020-03-02 ENCOUNTER — APPOINTMENT (OUTPATIENT)
Dept: NEUROLOGY | Facility: CLINIC | Age: 40
End: 2020-03-02
Payer: COMMERCIAL

## 2020-03-02 VITALS
DIASTOLIC BLOOD PRESSURE: 80 MMHG | BODY MASS INDEX: 26.16 KG/M2 | HEIGHT: 65 IN | SYSTOLIC BLOOD PRESSURE: 130 MMHG | WEIGHT: 157 LBS

## 2020-03-02 DIAGNOSIS — G45.0 VERTEBRO-BASILAR ARTERY SYNDROME: ICD-10-CM

## 2020-03-02 PROCEDURE — 99204 OFFICE O/P NEW MOD 45 MIN: CPT

## 2020-03-10 ENCOUNTER — FORM ENCOUNTER (OUTPATIENT)
Age: 40
End: 2020-03-10

## 2020-03-10 DIAGNOSIS — M54.2 CERVICALGIA: ICD-10-CM

## 2020-03-11 ENCOUNTER — OUTPATIENT (OUTPATIENT)
Dept: OUTPATIENT SERVICES | Facility: HOSPITAL | Age: 40
LOS: 1 days | End: 2020-03-11

## 2020-03-11 ENCOUNTER — APPOINTMENT (OUTPATIENT)
Dept: ULTRASOUND IMAGING | Facility: CLINIC | Age: 40
End: 2020-03-11
Payer: COMMERCIAL

## 2020-03-11 ENCOUNTER — APPOINTMENT (OUTPATIENT)
Dept: NEUROLOGY | Facility: CLINIC | Age: 40
End: 2020-03-11
Payer: COMMERCIAL

## 2020-03-11 DIAGNOSIS — G45.0 VERTEBRO-BASILAR ARTERY SYNDROME: ICD-10-CM

## 2020-03-11 PROCEDURE — 93886 INTRACRANIAL COMPLETE STUDY: CPT

## 2020-03-11 PROCEDURE — 93880 EXTRACRANIAL BILAT STUDY: CPT | Mod: 26

## 2020-03-11 PROCEDURE — 93890: CPT

## 2020-03-13 PROBLEM — M54.2 CERVICALGIA: Status: ACTIVE | Noted: 2020-03-13

## 2020-03-24 ENCOUNTER — APPOINTMENT (OUTPATIENT)
Dept: GASTROENTEROLOGY | Facility: CLINIC | Age: 40
End: 2020-03-24

## 2020-04-01 ENCOUNTER — APPOINTMENT (OUTPATIENT)
Dept: INTERNAL MEDICINE | Facility: CLINIC | Age: 40
End: 2020-04-01
Payer: COMMERCIAL

## 2020-04-01 PROCEDURE — 99442: CPT

## 2020-04-01 PROCEDURE — G2012 BRIEF CHECK IN BY MD/QHP: CPT

## 2020-05-14 ENCOUNTER — APPOINTMENT (OUTPATIENT)
Dept: INTERNAL MEDICINE | Facility: CLINIC | Age: 40
End: 2020-05-14

## 2020-05-18 ENCOUNTER — RX CHANGE (OUTPATIENT)
Age: 40
End: 2020-05-18

## 2020-05-21 ENCOUNTER — RX CHANGE (OUTPATIENT)
Age: 40
End: 2020-05-21

## 2020-06-18 ENCOUNTER — RX CHANGE (OUTPATIENT)
Age: 40
End: 2020-06-18

## 2020-06-19 ENCOUNTER — RX CHANGE (OUTPATIENT)
Age: 40
End: 2020-06-19

## 2020-06-28 ENCOUNTER — TRANSCRIPTION ENCOUNTER (OUTPATIENT)
Age: 40
End: 2020-06-28

## 2020-06-28 ENCOUNTER — RX RENEWAL (OUTPATIENT)
Age: 40
End: 2020-06-28

## 2020-06-29 ENCOUNTER — APPOINTMENT (OUTPATIENT)
Dept: INTERNAL MEDICINE | Facility: CLINIC | Age: 40
End: 2020-06-29
Payer: COMMERCIAL

## 2020-06-29 DIAGNOSIS — R42 DIZZINESS AND GIDDINESS: ICD-10-CM

## 2020-06-29 DIAGNOSIS — Z87.898 PERSONAL HISTORY OF OTHER SPECIFIED CONDITIONS: ICD-10-CM

## 2020-06-29 PROCEDURE — 99213 OFFICE O/P EST LOW 20 MIN: CPT | Mod: 95

## 2020-06-29 NOTE — HISTORY OF PRESENT ILLNESS
[Other Location: e.g. School (Enter Location, City,State)___] : at [unfilled], at the time of the visit. [Medical Office: ([facility])___] : at the medical office located in  [Verbal consent obtained from patient] : the patient, [unfilled] [de-identified] : As this is telemedicine visit no physical exam could be performed.  Diagnosis and treatment based upon symptoms provided\par \par he requested telemedicine visit because he needs refills on paxil and omeprazole\par \par He states he is doing really well on paxil and it is controlling his anxiety\par \par He states his acid reflex is well controlled on omeprazole.  He denies abdominal pain.  HE states he did see GI and had EGD\par \par No new complaints

## 2020-06-29 NOTE — ASSESSMENT
[FreeTextEntry1] : \par GERD\par -states sx well controlled with omeprazole-will refill\par -eh states he is asymptomatic on meds\par -states he has gained a few pounds\par -states he was seen by GI and had EGD\par -H. pylori breath test has been ordered previously\par \par Elevated alk phos:\par -hepatitis B and C serologies were negative\par -he states this was a known finding in past and worked up by previous PMD in [place]\par -abdominal US  showed fatty liver\par -he states he was seen by GI\par \par Dizziness:\par -seen by cardiology and neurology\par -MRI of brain showed punctate focus of non specific intensity in left corna radiata\par -no sx reported at this time\par \par Dyslipidemia:\par -I advised low fat/low cholesterol diet and weight loss\par \par Vitamin D insufficiency:\par -OTC vitamin D 3 1000 units daily\par \par Anxiety:\par -on paxil and doing well\par -I advised he follow up in 6-8 weeks in the office\par \par \par HCM\par \par CPE 2019\par \par Depression screenin/10/2020 PHQ 2 score 0\par \par EKG 2/10/2020\par \par Flu shot: he declined 2019\par \par Tdap: approx  per pt\par \par HIV testing: offered 2019 he declined\par \par Hepatitis C screenin2019 negative\par \par F/U 6-8 weeks\par \par \par \par

## 2020-06-29 NOTE — PHYSICAL EXAM
[No Acute Distress] : no acute distress [Normal Voice/Communication] : normal voice/communication [de-identified] : no respiratory distress, answers questions appropriately, appears well

## 2020-08-14 ENCOUNTER — APPOINTMENT (OUTPATIENT)
Dept: NEUROSURGERY | Facility: CLINIC | Age: 40
End: 2020-08-14
Payer: COMMERCIAL

## 2020-08-14 ENCOUNTER — APPOINTMENT (OUTPATIENT)
Dept: RADIOLOGY | Facility: CLINIC | Age: 40
End: 2020-08-14
Payer: COMMERCIAL

## 2020-08-14 ENCOUNTER — OUTPATIENT (OUTPATIENT)
Dept: OUTPATIENT SERVICES | Facility: HOSPITAL | Age: 40
LOS: 1 days | End: 2020-08-14
Payer: COMMERCIAL

## 2020-08-14 VITALS
SYSTOLIC BLOOD PRESSURE: 139 MMHG | OXYGEN SATURATION: 96 % | BODY MASS INDEX: 26.66 KG/M2 | WEIGHT: 160 LBS | TEMPERATURE: 98.1 F | HEIGHT: 65 IN | DIASTOLIC BLOOD PRESSURE: 83 MMHG | HEART RATE: 73 BPM

## 2020-08-14 DIAGNOSIS — M54.5 LOW BACK PAIN: ICD-10-CM

## 2020-08-14 PROCEDURE — 72110 X-RAY EXAM L-2 SPINE 4/>VWS: CPT | Mod: 26

## 2020-08-14 PROCEDURE — 99202 OFFICE O/P NEW SF 15 MIN: CPT

## 2020-08-14 PROCEDURE — 72110 X-RAY EXAM L-2 SPINE 4/>VWS: CPT

## 2020-08-14 NOTE — CONSULT LETTER
[Courtesy Letter:] : I had the pleasure of seeing your patient, [unfilled], in my office today. 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  Patient states this started approximately 5 to 6 years ago after lifting a heavy object while at work.  Patient states he heard a pop in his back.  Patient has had multiple flareups from then to now which he has treated with conservative therapies.  Patient states this most recent flareup started approximately 2 months ago with lifting a heavy object.  He reports constant sharp and stabbing 7/10 mid lower back pain with shooting radiating pain into the right buttocks.  He denies any numbness, tingling, or weakness to bilateral legs.  He reports back pain with mechanical movements.  He reports difficulties with walking due to pain.  He denies any bowel or bladder dysfunction.  He denies any tripping over his feet.  \par \par Patient underwent massage therapy last week.  He was referred by his massage therapist for a tenderness mass in his lower back.  Patient has previously trialed Aleve, Advil, and Tylenol with no relief.  Ice provides him with some relief.  Heat and Biofreeze provided no relief.  Patient is unable to take any other pain medications due to history of illicit drug use.\par \par Patient is alert and oriented.  No distress noted.  Bilateral positive straight leg test noted.  Strength to bilateral legs 5/5.  Sensation to light touch to bilateral legs equal and normal.  Negative clonus.  +3 reflexes noted to bilateral patella.  +2 reflexes noted to bilateral Achilles tendon.\par \par \par Considering the patient's history of chronic lower back pain, I provided the patient with a prescription for an x-ray and MRI of the lumbar spine.  We will follow-up over the phone once imaging is completed.  Patient is aware to call with any further questions or concerns or with any new or worsening symptoms. [FreeTextEntry3] : Jaymie Hernandez, MSN, FNP-BC\par Nurse Practitioner\par Neurosurgery\par [address] [place] [address], [address]nd floor \[place] [address] \par Office: [phone] \par Fax: [phone]\par \par

## 2020-08-22 ENCOUNTER — APPOINTMENT (OUTPATIENT)
Dept: MRI IMAGING | Facility: CLINIC | Age: 40
End: 2020-08-22
Payer: COMMERCIAL

## 2020-08-22 ENCOUNTER — OUTPATIENT (OUTPATIENT)
Dept: OUTPATIENT SERVICES | Facility: HOSPITAL | Age: 40
LOS: 1 days | End: 2020-08-22
Payer: COMMERCIAL

## 2020-08-22 DIAGNOSIS — M54.5 LOW BACK PAIN: ICD-10-CM

## 2020-08-22 PROCEDURE — 72148 MRI LUMBAR SPINE W/O DYE: CPT

## 2020-08-22 PROCEDURE — 72148 MRI LUMBAR SPINE W/O DYE: CPT | Mod: 26

## 2020-08-27 ENCOUNTER — APPOINTMENT (OUTPATIENT)
Dept: ORTHOPEDIC SURGERY | Facility: CLINIC | Age: 40
End: 2020-08-27

## 2020-09-22 ENCOUNTER — RX RENEWAL (OUTPATIENT)
Age: 40
End: 2020-09-22

## 2020-09-23 ENCOUNTER — RX RENEWAL (OUTPATIENT)
Age: 40
End: 2020-09-23

## 2020-11-17 ENCOUNTER — APPOINTMENT (OUTPATIENT)
Dept: INTERNAL MEDICINE | Facility: CLINIC | Age: 40
End: 2020-11-17
Payer: COMMERCIAL

## 2020-11-17 VITALS
HEART RATE: 74 BPM | BODY MASS INDEX: 29.32 KG/M2 | WEIGHT: 176 LBS | RESPIRATION RATE: 14 BRPM | SYSTOLIC BLOOD PRESSURE: 132 MMHG | TEMPERATURE: 97.5 F | DIASTOLIC BLOOD PRESSURE: 80 MMHG | OXYGEN SATURATION: 98 % | HEIGHT: 65 IN

## 2020-11-17 PROCEDURE — 99213 OFFICE O/P EST LOW 20 MIN: CPT | Mod: 25

## 2020-11-17 PROCEDURE — 96127 BRIEF EMOTIONAL/BEHAV ASSMT: CPT

## 2020-11-17 RX ORDER — HYDROXYZINE HYDROCHLORIDE 25 MG/1
25 TABLET ORAL
Qty: 30 | Refills: 0 | Status: DISCONTINUED | COMMUNITY
Start: 2020-04-01 | End: 2020-11-17

## 2020-11-17 NOTE — ASSESSMENT
[FreeTextEntry1] : \par GERD\par -states sx well controlled with omeprazole\par \par Elevated alk phos:\par -hepatitis B and C serologies were negative\par -he states this was a known finding in past and worked up by previous PMD in [place]\par -abdominal US  showed fatty liver\par -he states he was seen by GI\par -will check cmp\par \par Dyslipidemia:\par -I advised low fat/low cholesterol diet and weight loss\par -will check fasting labs\par \par Overweight:\par -he has gained 16lbs due to poor diet\par -he should adhere to low fat/low cholesterol diet, low carbohydrate diet and weight loss advised\par \par Vitamin D insufficiency:\par -OTC vitamin D 3 1000 units daily\par -will check vitamin D level\par \par Anxiety:\par -on paxil and doing well\par -PHQ 2 score 0\par \par \par HCM\par \par CPE 2019\par \par Depression screenin2020 PHQ 2 score 0\par \par EKG 2/10/2020\par \par Flu shot: he declined 2020\par \par Tdap: approx 2015 per pt\par \par HIV testing: offered 2020 he declined\par \par Hepatitis C screenin2019 negative\par \par F/U 6 months\par \par \par \par

## 2020-11-17 NOTE — PHYSICAL EXAM
[No Acute Distress] : no acute distress [Well Nourished] : well nourished [Well Developed] : well developed [Well-Appearing] : well-appearing [Normal Voice/Communication] : normal voice/communication [Normal Sclera/Conjunctiva] : normal sclera/conjunctiva [PERRL] : pupils equal round and reactive to light [Normal Oropharynx] : the oropharynx was normal [No JVD] : no jugular venous distention [No Lymphadenopathy] : no lymphadenopathy [Supple] : supple [Thyroid Normal, No Nodules] : the thyroid was normal and there were no nodules present [No Respiratory Distress] : no respiratory distress  [No Accessory Muscle Use] : no accessory muscle use [Clear to Auscultation] : lungs were clear to auscultation bilaterally [Normal Rate] : normal rate  [Regular Rhythm] : with a regular rhythm [Normal S1, S2] : normal S1 and S2 [No Murmur] : no murmur heard [No Edema] : there was no peripheral edema [Soft] : abdomen soft [Non Tender] : non-tender [Non-distended] : non-distended [No Masses] : no abdominal mass palpated [No HSM] : no HSM [Normal Bowel Sounds] : normal bowel sounds [No Joint Swelling] : no joint swelling [No Rash] : no rash [No Focal Deficits] : no focal deficits [Normal Affect] : the affect was normal [Normal Insight/Judgement] : insight and judgment were intact

## 2020-11-17 NOTE — HISTORY OF PRESENT ILLNESS
[de-identified] : Here for follow up of anxiety.  he states he is doing very well on paxil\par \par No complaints

## 2020-11-17 NOTE — REVIEW OF SYSTEMS
[Negative] : Neurological [Fever] : no fever [Chills] : no chills [Fatigue] : no fatigue [Chest Pain] : no chest pain [Palpitations] : no palpitations [Lower Ext Edema] : no lower extremity edema [Shortness Of Breath] : no shortness of breath [Wheezing] : no wheezing [Cough] : no cough [Dyspnea on Exertion] : not dyspnea on exertion [Abdominal Pain] : no abdominal pain [Nausea] : no nausea [Diarrhea] : no diarrhea [Vomiting] : no vomiting [Anxiety] : no anxiety [Depression] : no depression [FreeTextEntry2] : weight gain

## 2020-12-21 PROBLEM — Z86.69 HISTORY OF OTITIS MEDIA: Status: RESOLVED | Noted: 2019-12-30 | Resolved: 2020-12-21

## 2020-12-21 PROBLEM — Z86.69 HISTORY OF OTITIS MEDIA: Status: RESOLVED | Noted: 2019-04-02 | Resolved: 2020-12-21

## 2020-12-28 ENCOUNTER — RX RENEWAL (OUTPATIENT)
Age: 40
End: 2020-12-28

## 2021-01-17 ENCOUNTER — RX RENEWAL (OUTPATIENT)
Age: 41
End: 2021-01-17

## 2021-04-09 NOTE — ED ADULT TRIAGE NOTE - PAIN: PRESENCE, MLM
09/07/21 1701   Follow Up   Requires SLP Follow Up Yes   Attempted, but not seen Yes   Reason not seen   (Infant tachypnic; mother indicated not ready to attempt fdg)   Re-Attempt Plan Will re-attempt tomorrow      limitations in strength palpitations/complains of pain/discomfort

## 2021-04-12 ENCOUNTER — RX RENEWAL (OUTPATIENT)
Age: 41
End: 2021-04-12

## 2021-06-21 ENCOUNTER — RX RENEWAL (OUTPATIENT)
Age: 41
End: 2021-06-21

## 2021-07-12 ENCOUNTER — RX RENEWAL (OUTPATIENT)
Age: 41
End: 2021-07-12

## 2021-08-04 ENCOUNTER — APPOINTMENT (OUTPATIENT)
Dept: INTERNAL MEDICINE | Facility: CLINIC | Age: 41
End: 2021-08-04

## 2021-09-25 ENCOUNTER — RX RENEWAL (OUTPATIENT)
Age: 41
End: 2021-09-25

## 2021-10-03 ENCOUNTER — RX RENEWAL (OUTPATIENT)
Age: 41
End: 2021-10-03

## 2021-10-28 ENCOUNTER — RX CHANGE (OUTPATIENT)
Age: 41
End: 2021-10-28

## 2021-10-29 ENCOUNTER — RX RENEWAL (OUTPATIENT)
Age: 41
End: 2021-10-29

## 2021-11-03 ENCOUNTER — APPOINTMENT (OUTPATIENT)
Dept: ORTHOPEDIC SURGERY | Facility: CLINIC | Age: 41
End: 2021-11-03
Payer: COMMERCIAL

## 2021-11-03 DIAGNOSIS — M79.671 PAIN IN RIGHT FOOT: ICD-10-CM

## 2021-11-03 PROCEDURE — 73630 X-RAY EXAM OF FOOT: CPT | Mod: RT

## 2021-11-03 PROCEDURE — 99204 OFFICE O/P NEW MOD 45 MIN: CPT

## 2021-11-03 NOTE — ADDENDUM
[FreeTextEntry1] : I, Alison Avila, acted solely as a scribe for Dr. Konstantin Joiner on this date 11/03/2021.\par \par All medical record entries made by the Scribe were at my, Dr. Konstantin Joiner, direction and personally dictated by me on 11/03/2021 . I have reviewed the chart and agree that the record accurately reflects my personal performance of the history, physical exam, assessment and plan. I have also personally directed, reviewed, and agreed with the chart.	\par

## 2021-11-03 NOTE — PHYSICAL EXAM
[de-identified] : General: Alert and oriented x3. In no acute distress. Pleasant in nature with a normal affect. No apparent respiratory distress.\par \par Right Foot Exam\par Skin: Clean, dry, intact\par Inspection: No obvious malalignment, no masses, no swelling, no effusion\par Pulses: 2+ DP/PT pulses\par ROM: FOOT Full  ROM of digits, ANKLE 10 degrees of dorsiflexion, 40 degrees of plantarflexion, 10 degrees of subtalar motion.\par Painful ROM: None\par Tenderness: + Plantar lateral pain. + Plantar 5th base pain. No tenderness over the medial malleolus, No tenderness over the lateral malleolus, no CFL/ATFL/PTFL pain, no deltoid ligament pain. No heel pain. No Achilles tenderness. No 5th metatarsal pain. No pain to the LisFranc joint. No ttp over the posterior tibial tendon.\par Stability: Negative anterior/posterior drawer.\par Strength: 5/5 ADD/ABD/TA/GS/EHL/FHL/EDL\par Neuro: Sensation in tact to light touch throughout\par Additional tests: Negative Mortons test, negative tarsal tunnel tinels, negative single heel rise.				 [de-identified] : 3V of the right foot were ordered, obtained, and reviewed by me today, 11/03/2021, revealed: No acute fractures. \par

## 2021-11-03 NOTE — HISTORY OF PRESENT ILLNESS
[Sneakers] : kait [FreeTextEntry1] : 11/3/2021: ISIDRO ORTIZ is a 41 year old male presenting for an initial evaluation of right foot pain, ongoing for the past 2 weeks. The patient’s pain is noted to be a 7/10, localized to the plantar lateral aspect of his foot. Pain is at worst while walking and also with pushing off on his heel. The patient cannot attribute their pain to any injury, fall, or trauma. 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 He does confirms tingling sensations to the foot. 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 He states that he is currently on Paroxetine 1x daily.  No other complaints.

## 2021-11-03 NOTE — DISCUSSION/SUMMARY
[de-identified] : Today I had a lengthy discussion with the patient regarding their right foot pain. I have addressed all the patient's concerns surrounding the pathology of their condition. XR imaging was completed in office today and results were reviewed with the patient. I recommend that the patient utilize meloxicam with food once per day as instructed. A prescription for the meloxicam was ordered for the patient in the office today. I also advised that the patient utilize Voltaren gel topically. If the Voltaren gel could not be obtained, Icy Hot, Biofreeze, or Bengay can be utilized instead. The patient understood and verbally agreed to the treatment plan. All of their questions were answered and they were satisfied with the visit. The patient should call the office if they have any questions or experience worsening symptoms. I would like to see the patient back in the office in 2 weeks if there is no improvement to reassess their condition and to order an MRI for further evaluation. 				\par 
no strenuous activity for 2 weeks/quiet play

## 2021-11-26 ENCOUNTER — RX RENEWAL (OUTPATIENT)
Age: 41
End: 2021-11-26

## 2021-12-17 ENCOUNTER — APPOINTMENT (OUTPATIENT)
Dept: INTERNAL MEDICINE | Facility: CLINIC | Age: 41
End: 2021-12-17
Payer: COMMERCIAL

## 2021-12-17 VITALS
WEIGHT: 190 LBS | RESPIRATION RATE: 15 BRPM | HEART RATE: 88 BPM | HEIGHT: 65 IN | SYSTOLIC BLOOD PRESSURE: 126 MMHG | BODY MASS INDEX: 31.65 KG/M2 | DIASTOLIC BLOOD PRESSURE: 88 MMHG | OXYGEN SATURATION: 98 % | TEMPERATURE: 97.2 F

## 2021-12-17 PROCEDURE — 99213 OFFICE O/P EST LOW 20 MIN: CPT | Mod: 25

## 2021-12-17 PROCEDURE — 36415 COLL VENOUS BLD VENIPUNCTURE: CPT

## 2021-12-17 PROCEDURE — 96127 BRIEF EMOTIONAL/BEHAV ASSMT: CPT

## 2021-12-17 NOTE — HISTORY OF PRESENT ILLNESS
[de-identified] : Here for follow up and medication refilled\par \par He states he feels well and denies any complaints

## 2021-12-17 NOTE — PHYSICAL EXAM
[Well Nourished] : well nourished [Well Developed] : well developed [Well-Appearing] : well-appearing [Normal Voice/Communication] : normal voice/communication [PERRL] : pupils equal round and reactive to light [Normal Oropharynx] : the oropharynx was normal [No JVD] : no jugular venous distention [No Lymphadenopathy] : no lymphadenopathy [Supple] : supple [Thyroid Normal, No Nodules] : the thyroid was normal and there were no nodules present [No Respiratory Distress] : no respiratory distress  [No Accessory Muscle Use] : no accessory muscle use [Clear to Auscultation] : lungs were clear to auscultation bilaterally [Normal Rate] : normal rate  [Regular Rhythm] : with a regular rhythm [Normal S1, S2] : normal S1 and S2 [No Murmur] : no murmur heard [No Edema] : there was no peripheral edema [Soft] : abdomen soft [Non Tender] : non-tender [Non-distended] : non-distended [No Masses] : no abdominal mass palpated [No HSM] : no HSM [Normal Bowel Sounds] : normal bowel sounds [No Rash] : no rash [No Focal Deficits] : no focal deficits [Normal Affect] : the affect was normal [Normal Insight/Judgement] : insight and judgment were intact [Normal Outer Ear/Nose] : the outer ears and nose were normal in appearance [Normal TMs] : both tympanic membranes were normal [Normal Nasal Mucosa] : the nasal mucosa was normal [No Carotid Bruits] : no carotid bruits [No Spinal Tenderness] : no spinal tenderness [Alert and Oriented x3] : oriented to person, place, and time [Normal Mood] : the mood was normal

## 2021-12-17 NOTE — HEALTH RISK ASSESSMENT
[0] : 2) Feeling down, depressed, or hopeless: Not at all (0) [PHQ-2 Negative - No further assessment needed] : PHQ-2 Negative - No further assessment needed [[id number]] : 0

## 2021-12-17 NOTE — ASSESSMENT
[FreeTextEntry1] : \par GERD\par -states sx well controlled with omeprazole which he is taking once daily-will refill\par \par Elevated alk phos:\par -hepatitis B and C serologies were negative\par -he states this was a known finding in past and worked up by previous PMD in [place]\par -abdominal US  showed fatty liver\par - seen by GI in past\par -will check cmp\par \par Dyslipidemia:\par -will check fasting labs\par \par Obesity\par -he has gained 14lbs since last visit\par -he should adhere to low fat/low cholesterol diet, low carbohydrate diet and weight loss advised\par -exercise advised\par \par Vitamin D insufficiency:\par -will check vitamin D level\par \par Anxiety:\par -on paxil and doing well-will refill\par -PHQ 2 score 0\par \par \par HCM\par \par CPE 2019-advised\par \par Depression screenin2021 PHQ 2 score 0\par \par EKG 2/10/2020\par \par Flu shot: he declined 2021\par \par Tdap: approx 2015 per pt\par \par Covid vaccine advised: he refuses\par \par HIV testing: offered 2021 he declined\par \par Hepatitis C screenin2019 negative\par \par PSA: ordered today\par \par F/U 6 months for CPE.  fasting labs drawn in office today\par \par \par \par

## 2021-12-17 NOTE — REVIEW OF SYSTEMS
[Negative] : Neurological [Fever] : no fever [Chills] : no chills [Fatigue] : no fatigue [Chest Pain] : no chest pain [Palpitations] : no palpitations [Lower Ext Edema] : no lower extremity edema [Shortness Of Breath] : no shortness of breath [Wheezing] : no wheezing [Cough] : no cough [Dyspnea on Exertion] : not dyspnea on exertion [Abdominal Pain] : no abdominal pain [Nausea] : no nausea [Diarrhea] : no diarrhea [Vomiting] : no vomiting [Anxiety] : no anxiety [Depression] : no depression

## 2021-12-19 LAB
25(OH)D3 SERPL-MCNC: 42.1 NG/ML
ALBUMIN SERPL ELPH-MCNC: 4.6 G/DL
ALP BLD-CCNC: 231 U/L
ALT SERPL-CCNC: 48 U/L
ANION GAP SERPL CALC-SCNC: 13 MMOL/L
APPEARANCE: CLEAR
AST SERPL-CCNC: 33 U/L
BACTERIA: NEGATIVE
BASOPHILS # BLD AUTO: 0.02 K/UL
BASOPHILS NFR BLD AUTO: 0.3 %
BILIRUB SERPL-MCNC: 0.4 MG/DL
BILIRUBIN URINE: NEGATIVE
BLOOD URINE: NEGATIVE
BUN SERPL-MCNC: 16 MG/DL
CALCIUM SERPL-MCNC: 9.9 MG/DL
CHLORIDE SERPL-SCNC: 99 MMOL/L
CHOLEST SERPL-MCNC: 210 MG/DL
CO2 SERPL-SCNC: 26 MMOL/L
COLOR: NORMAL
CREAT SERPL-MCNC: 1.04 MG/DL
EOSINOPHIL # BLD AUTO: 0.1 K/UL
EOSINOPHIL NFR BLD AUTO: 1.4 %
ESTIMATED AVERAGE GLUCOSE: 111 MG/DL
GGT SERPL-CCNC: 31 U/L
GLUCOSE QUALITATIVE U: NEGATIVE
GLUCOSE SERPL-MCNC: 93 MG/DL
HBA1C MFR BLD HPLC: 5.5 %
HCT VFR BLD CALC: 48.9 %
HDLC SERPL-MCNC: 41 MG/DL
HGB BLD-MCNC: 15.9 G/DL
HYALINE CASTS: 1 /LPF
IMM GRANULOCYTES NFR BLD AUTO: 0.3 %
KETONES URINE: NEGATIVE
LDLC SERPL CALC-MCNC: 124 MG/DL
LEUKOCYTE ESTERASE URINE: NEGATIVE
LYMPHOCYTES # BLD AUTO: 2.3 K/UL
LYMPHOCYTES NFR BLD AUTO: 33.1 %
MAN DIFF?: NORMAL
MCHC RBC-ENTMCNC: 29.5 PG
MCHC RBC-ENTMCNC: 32.5 GM/DL
MCV RBC AUTO: 90.7 FL
MICROSCOPIC-UA: NORMAL
MONOCYTES # BLD AUTO: 0.49 K/UL
MONOCYTES NFR BLD AUTO: 7.1 %
NEUTROPHILS # BLD AUTO: 4.01 K/UL
NEUTROPHILS NFR BLD AUTO: 57.8 %
NITRITE URINE: NEGATIVE
NONHDLC SERPL-MCNC: 169 MG/DL
PH URINE: 7
PLATELET # BLD AUTO: 186 K/UL
POTASSIUM SERPL-SCNC: 5.2 MMOL/L
PROT SERPL-MCNC: 7.3 G/DL
PROTEIN URINE: NEGATIVE
PSA SERPL-MCNC: 1.03 NG/ML
RBC # BLD: 5.39 M/UL
RBC # FLD: 12.7 %
RED BLOOD CELLS URINE: 0 /HPF
SODIUM SERPL-SCNC: 137 MMOL/L
SPECIFIC GRAVITY URINE: 1.01
SQUAMOUS EPITHELIAL CELLS: 0 /HPF
T4 FREE SERPL-MCNC: 1.2 NG/DL
TRIGL SERPL-MCNC: 227 MG/DL
TSH SERPL-ACNC: 0.73 UIU/ML
UROBILINOGEN URINE: NORMAL
WBC # FLD AUTO: 6.94 K/UL
WHITE BLOOD CELLS URINE: 0 /HPF

## 2021-12-23 ENCOUNTER — NON-APPOINTMENT (OUTPATIENT)
Age: 41
End: 2021-12-23

## 2021-12-29 ENCOUNTER — APPOINTMENT (OUTPATIENT)
Dept: INTERNAL MEDICINE | Facility: CLINIC | Age: 41
End: 2021-12-29

## 2022-01-07 ENCOUNTER — RX RENEWAL (OUTPATIENT)
Age: 42
End: 2022-01-07

## 2022-01-09 ENCOUNTER — TRANSCRIPTION ENCOUNTER (OUTPATIENT)
Age: 42
End: 2022-01-09

## 2022-03-06 ENCOUNTER — APPOINTMENT (OUTPATIENT)
Dept: ULTRASOUND IMAGING | Facility: CLINIC | Age: 42
End: 2022-03-06
Payer: COMMERCIAL

## 2022-03-06 ENCOUNTER — OUTPATIENT (OUTPATIENT)
Dept: OUTPATIENT SERVICES | Facility: HOSPITAL | Age: 42
LOS: 1 days | End: 2022-03-06
Payer: COMMERCIAL

## 2022-03-06 ENCOUNTER — TRANSCRIPTION ENCOUNTER (OUTPATIENT)
Age: 42
End: 2022-03-06

## 2022-03-06 DIAGNOSIS — Z00.8 ENCOUNTER FOR OTHER GENERAL EXAMINATION: ICD-10-CM

## 2022-03-06 PROCEDURE — 76770 US EXAM ABDO BACK WALL COMP: CPT

## 2022-03-06 PROCEDURE — 76770 US EXAM ABDO BACK WALL COMP: CPT | Mod: 26

## 2022-03-30 ENCOUNTER — APPOINTMENT (OUTPATIENT)
Dept: UROLOGY | Facility: CLINIC | Age: 42
End: 2022-03-30
Payer: COMMERCIAL

## 2022-03-30 VITALS
HEART RATE: 97 BPM | DIASTOLIC BLOOD PRESSURE: 80 MMHG | BODY MASS INDEX: 28.88 KG/M2 | SYSTOLIC BLOOD PRESSURE: 116 MMHG | HEIGHT: 67 IN | WEIGHT: 184 LBS

## 2022-03-30 DIAGNOSIS — Z87.442 PERSONAL HISTORY OF URINARY CALCULI: ICD-10-CM

## 2022-03-30 PROCEDURE — 99203 OFFICE O/P NEW LOW 30 MIN: CPT

## 2022-03-30 NOTE — HISTORY OF PRESENT ILLNESS
[FreeTextEntry1] : Was having some left mid back pain 1 month ago, pain subsided while he was on vacation and 1 week ago he had an episode of left back pain. 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 He went to an Urgent care about 3 weeks ago, x-ray was done and urine checked. He passes a kidney stone back in  August, first occurrence. With h/o herniated discs L4L5.\par Denies frequency, urgency, dysuria, hematuria, slow stream

## 2022-03-30 NOTE — ASSESSMENT
[FreeTextEntry1] : H/O kidney stone\par \par Records reviewed: KUB 3/6 no stone, PVR WNL, normal study\par NSAID PRN for back pain\par If pain returns will do CT renal stone hunt\par RTO as needed\par \par

## 2022-03-30 NOTE — PHYSICAL EXAM
[General Appearance - Well Developed] : well developed [General Appearance - Well Nourished] : well nourished [Normal Appearance] : normal appearance [Well Groomed] : well groomed [General Appearance - In No Acute Distress] : no acute distress [] : no respiratory distress [Respiration, Rhythm And Depth] : normal respiratory rhythm and effort [Exaggerated Use Of Accessory Muscles For Inspiration] : no accessory muscle use [Costovertebral Angle Tenderness] : no ~M costovertebral angle tenderness [Normal Station and Gait] : the gait and station were normal for the patient's age [Skin Color & Pigmentation] : normal skin color and pigmentation [Oriented To Time, Place, And Person] : oriented to person, place, and time [Mood] : the mood was normal [Affect] : the affect was normal [Not Anxious] : not anxious

## 2022-03-30 NOTE — LETTER BODY
[Dear  ___] : Dear  [unfilled], [Courtesy Letter:] : I had the pleasure of seeing your patient, [unfilled], in my office today. [Please see my note below.] : Please see my note below. [Sincerely,] : Sincerely, [FreeTextEntry3] : Ed\par \par Félix Resendiz MD\par [facility]\par  of Urology\par [employer] [facility]\par

## 2022-05-24 ENCOUNTER — APPOINTMENT (OUTPATIENT)
Dept: ORTHOPEDIC SURGERY | Facility: CLINIC | Age: 42
End: 2022-05-24
Payer: COMMERCIAL

## 2022-05-24 VITALS
HEART RATE: 70 BPM | DIASTOLIC BLOOD PRESSURE: 82 MMHG | BODY MASS INDEX: 27 KG/M2 | WEIGHT: 172 LBS | HEIGHT: 67 IN | SYSTOLIC BLOOD PRESSURE: 124 MMHG

## 2022-05-24 DIAGNOSIS — M25.832 OTHER SPECIFIED JOINT DISORDERS, LEFT WRIST: ICD-10-CM

## 2022-05-24 DIAGNOSIS — M25.532 PAIN IN LEFT WRIST: ICD-10-CM

## 2022-05-24 PROCEDURE — 99215 OFFICE O/P EST HI 40 MIN: CPT

## 2022-05-24 PROCEDURE — 73110 X-RAY EXAM OF WRIST: CPT | Mod: LT

## 2022-05-25 PROBLEM — M25.832 MASS OF JOINT OF LEFT WRIST: Status: ACTIVE | Noted: 2022-05-24

## 2022-05-25 NOTE — HISTORY OF PRESENT ILLNESS
[FreeTextEntry1] : 05/24/2022: Patient is a 41 year-old, right-hand-dominant male who presents to the office today for initial evaluation of left wrist pain. Works as a  and owns a hardwood floor sanding business. Pain has been present for the past 2 weeks and has been worsening in intensity. It significantly affects his work on a daily basis, as he relies heavily on his hands. He denies any injury or inciting event. He notes there is a lump over the volar aspect of the wrist that came about 2-3 days ago. He has significant pain, especially with wrist flexion and palpation of the volar aspect of the wrist. The pain radiates into his first webspace, particularly with palpation of the mass or wrist flexion, and is described as sharp and shooting in nature. He tried over-the-counter anti-inflammatories which were not effective. He denies any paresthesias in his finger.

## 2022-05-25 NOTE — PHYSICAL EXAM
[Normal Finger/nose] : finger to nose coordination [Normal] : no peripheral adenopathy appreciated [de-identified] : Left Wrist Exam\par \par Skin: Intact with no erythema, no ecchymosis\par Exam: No foveal TTP, no SL TTP, no TTP of snuffbox, distal radius or distal ulna. Negative Granados's test, no DRUJ instability, no ECU subluxation. Negative Grind Test. Negative Finkelstein's Test, small mass over the volar aspect of the distal radius with significant tenderness to palpation. Upon tapping the mass, patient with a shooting sensation that radiates into the 1st webspace\par ROM: 50 degrees of flexion, 450 degrees of extension. Full pronation and supination without pain.\par Neuro: Sensation is grossly intact without any deficits.  strength is 5/5. Positive Tinel's over the mass at the Wrist and negative at the elbow. Negative Phalen's at the wrist.\par Vasc: Distal Pulses 2+ and capillary refill is brisk.\par  [de-identified] : HARDIKR [de-identified] : 3 xray views of the left wrist were obtained.\par \par No fractures, dislocations or foreign bodies noted.

## 2022-05-25 NOTE — ASSESSMENT
[FreeTextEntry1] : Patient presents with left wrist pain. Their symptoms are consistent with . The nature of this condition was described at length with the patient and they verbalized understanding. \par \par Recommendations: \par -Assess for Nerve Sheath Tumor v. Ganglion Cyst (more likely a ganglion, however, with a positive Tinel's over the mass, nerve sheath tumor cannot be ruled out without advanced imaging) --> MRI of left wrist\par -OTC anti-inflammatories for pain\par -Followup with Dr. Bernstein after MRI for surgical consultation\par -Patient will call when MRI appointment is scheduled, to inform me. Will help to facilitate followup visit with Dr. Bernstein, as the patient is having significant enough pain that it affects his work every day.\par \par Patient is in full agreement with this plan and very appreciative of his care.\par

## 2022-06-05 ENCOUNTER — OUTPATIENT (OUTPATIENT)
Dept: OUTPATIENT SERVICES | Facility: HOSPITAL | Age: 42
LOS: 1 days | End: 2022-06-05
Payer: COMMERCIAL

## 2022-06-05 ENCOUNTER — APPOINTMENT (OUTPATIENT)
Dept: MRI IMAGING | Facility: CLINIC | Age: 42
End: 2022-06-05
Payer: COMMERCIAL

## 2022-06-05 DIAGNOSIS — M25.532 PAIN IN LEFT WRIST: ICD-10-CM

## 2022-06-05 DIAGNOSIS — Z00.8 ENCOUNTER FOR OTHER GENERAL EXAMINATION: ICD-10-CM

## 2022-06-05 PROCEDURE — 73221 MRI JOINT UPR EXTREM W/O DYE: CPT

## 2022-06-05 PROCEDURE — 73221 MRI JOINT UPR EXTREM W/O DYE: CPT | Mod: 26,LT

## 2022-06-15 ENCOUNTER — APPOINTMENT (OUTPATIENT)
Dept: INTERNAL MEDICINE | Facility: CLINIC | Age: 42
End: 2022-06-15

## 2022-06-28 ENCOUNTER — APPOINTMENT (OUTPATIENT)
Dept: ORTHOPEDIC SURGERY | Facility: CLINIC | Age: 42
End: 2022-06-28

## 2022-06-28 PROCEDURE — 20550 NJX 1 TENDON SHEATH/LIGAMENT: CPT | Mod: FA

## 2022-06-28 PROCEDURE — 99204 OFFICE O/P NEW MOD 45 MIN: CPT | Mod: 25

## 2022-07-08 ENCOUNTER — RX RENEWAL (OUTPATIENT)
Age: 42
End: 2022-07-08

## 2022-07-12 ENCOUNTER — APPOINTMENT (OUTPATIENT)
Dept: ORTHOPEDIC SURGERY | Facility: CLINIC | Age: 42
End: 2022-07-12

## 2022-07-12 DIAGNOSIS — M65.312 TRIGGER THUMB, LEFT THUMB: ICD-10-CM

## 2022-07-12 PROCEDURE — 99214 OFFICE O/P EST MOD 30 MIN: CPT | Mod: 25

## 2022-07-12 PROCEDURE — 20550 NJX 1 TENDON SHEATH/LIGAMENT: CPT | Mod: FA

## 2022-07-13 ENCOUNTER — APPOINTMENT (OUTPATIENT)
Dept: INTERNAL MEDICINE | Facility: CLINIC | Age: 42
End: 2022-07-13

## 2022-07-13 VITALS
BODY MASS INDEX: 28.09 KG/M2 | SYSTOLIC BLOOD PRESSURE: 122 MMHG | OXYGEN SATURATION: 98 % | WEIGHT: 179 LBS | TEMPERATURE: 98 F | HEIGHT: 67 IN | HEART RATE: 90 BPM | RESPIRATION RATE: 15 BRPM | DIASTOLIC BLOOD PRESSURE: 84 MMHG

## 2022-07-13 DIAGNOSIS — K76.0 FATTY (CHANGE OF) LIVER, NOT ELSEWHERE CLASSIFIED: ICD-10-CM

## 2022-07-13 DIAGNOSIS — E55.9 VITAMIN D DEFICIENCY, UNSPECIFIED: ICD-10-CM

## 2022-07-13 DIAGNOSIS — M67.432 GANGLION, LEFT WRIST: ICD-10-CM

## 2022-07-13 DIAGNOSIS — R74.8 ABNORMAL LEVELS OF OTHER SERUM ENZYMES: ICD-10-CM

## 2022-07-13 PROCEDURE — 36415 COLL VENOUS BLD VENIPUNCTURE: CPT

## 2022-07-13 PROCEDURE — 99213 OFFICE O/P EST LOW 20 MIN: CPT | Mod: 25

## 2022-07-13 PROCEDURE — 96127 BRIEF EMOTIONAL/BEHAV ASSMT: CPT

## 2022-07-13 NOTE — PHYSICAL EXAM
[Well Nourished] : well nourished [Well Developed] : well developed [Well-Appearing] : well-appearing [Normal Voice/Communication] : normal voice/communication [PERRL] : pupils equal round and reactive to light [Normal Oropharynx] : the oropharynx was normal [No JVD] : no jugular venous distention [No Lymphadenopathy] : no lymphadenopathy [Supple] : supple [Thyroid Normal, No Nodules] : the thyroid was normal and there were no nodules present [No Respiratory Distress] : no respiratory distress  [No Accessory Muscle Use] : no accessory muscle use [Clear to Auscultation] : lungs were clear to auscultation bilaterally [Normal Rate] : normal rate  [Regular Rhythm] : with a regular rhythm [Normal S1, S2] : normal S1 and S2 [No Murmur] : no murmur heard [No Edema] : there was no peripheral edema [Soft] : abdomen soft [Non Tender] : non-tender [Non-distended] : non-distended [No Masses] : no abdominal mass palpated [No HSM] : no HSM [Normal Bowel Sounds] : normal bowel sounds [No Rash] : no rash [Normal Affect] : the affect was normal [Alert and Oriented x3] : oriented to person, place, and time [Normal Mood] : the mood was normal [Normal Insight/Judgement] : insight and judgment were intact [No Acute Distress] : no acute distress

## 2022-07-13 NOTE — ASSESSMENT
[FreeTextEntry1] : \par Left wrist ganglion cyst/hand pain\par -followed by Ortho Hand and got injection yesterday\par \par GERD\par -states sx well controlled with omeprazole which he is taking once daily-will refill\par \par Elevated alk phos:\par -hepatitis B and C serologies were negative\par -he states this was a known finding in past and worked up by previous PMD in [place]\par -Previous abdominal US  showed fatty liver\par - seen by GI in past\par -I previously ordered repeat blood work and abdominal ultrasound but he did not have done\par -I will check CMP, GGT, alkaline phosphatase isoenzymes, hepatitis B and C serologies\par -I have again ordered abdominal ultrasound\par \par Dyslipidemia:\par -will check fasting labs\par \par Overweight\par -BMI is 28\par -He has lost some weight eating healthier and exercising\par \par Vitamin D insufficiency:\par -Vitamin D 25 OH was normal 2021\par \par Anxiety:\par -on paxil and doing well-will refill\par -PHQ 2 score 0\par \par \par HCM\par \par CPE 2019-advised\par \par Depression screenin2022 PHQ 2 score 0\par \par EKG 2/10/2020\par \par Flu shot: he declined 2021\par \par Tdap: approx  per pt\par \par Covid vaccine he refuses\par \par HIV testing: offered 2022 he declined\par \par Hepatitis C screenin2019 negative\par \par PSA: 1.03 2021\par \par F/U 6 months for CPE.  fasting labs drawn in office today\par \par \par \par

## 2022-07-13 NOTE — HISTORY OF PRESENT ILLNESS
[de-identified] : Here for follow up of GERD, dyslipidemia, GERD\par \par He states he did not get a chance to do labs which I ordered previously\par \par He states he overall feels well\par \par HE needs meds refilled\par \par he states he has lost some weight through diet and exercise

## 2022-07-21 LAB
ALBUMIN SERPL ELPH-MCNC: 4.9 G/DL
ALP BLD-CCNC: 179 U/L
ALP BLD-CCNC: 182 IU/L
ALP BONE CFR SERPL: 62 %
ALP INTEST CFR SERPL: 18 %
ALP LIVER CFR SERPL: 20 %
ALT SERPL-CCNC: 24 U/L
ANION GAP SERPL CALC-SCNC: 14 MMOL/L
AST SERPL-CCNC: 22 U/L
BILIRUB SERPL-MCNC: 0.8 MG/DL
BUN SERPL-MCNC: 23 MG/DL
CALCIUM SERPL-MCNC: 9.7 MG/DL
CHLORIDE SERPL-SCNC: 101 MMOL/L
CHOLEST SERPL-MCNC: 256 MG/DL
CO2 SERPL-SCNC: 23 MMOL/L
CREAT SERPL-MCNC: 1.14 MG/DL
EGFR: 83 ML/MIN/1.73M2
GGT SERPL-CCNC: 22 U/L
GLUCOSE SERPL-MCNC: 114 MG/DL
HBV CORE IGG+IGM SER QL: NONREACTIVE
HBV SURFACE AB SER QL: NONREACTIVE
HBV SURFACE AG SER QL: NONREACTIVE
HCV AB SER QL: NONREACTIVE
HCV S/CO RATIO: 0.11 S/CO
HDLC SERPL-MCNC: 62 MG/DL
LDLC SERPL CALC-MCNC: 176 MG/DL
NONHDLC SERPL-MCNC: 193 MG/DL
POTASSIUM SERPL-SCNC: 4.5 MMOL/L
PROT SERPL-MCNC: 7.6 G/DL
SODIUM SERPL-SCNC: 138 MMOL/L
TRIGL SERPL-MCNC: 89 MG/DL

## 2022-07-22 ENCOUNTER — NON-APPOINTMENT (OUTPATIENT)
Age: 42
End: 2022-07-22

## 2022-08-23 ENCOUNTER — APPOINTMENT (OUTPATIENT)
Dept: ULTRASOUND IMAGING | Facility: CLINIC | Age: 42
End: 2022-08-23

## 2022-08-23 ENCOUNTER — OUTPATIENT (OUTPATIENT)
Dept: OUTPATIENT SERVICES | Facility: HOSPITAL | Age: 42
LOS: 1 days | End: 2022-08-23

## 2022-08-23 ENCOUNTER — APPOINTMENT (OUTPATIENT)
Dept: NUCLEAR MEDICINE | Facility: CLINIC | Age: 42
End: 2022-08-23

## 2022-08-23 DIAGNOSIS — R74.8 ABNORMAL LEVELS OF OTHER SERUM ENZYMES: ICD-10-CM

## 2022-08-23 PROCEDURE — 76700 US EXAM ABDOM COMPLETE: CPT | Mod: 26

## 2022-08-23 PROCEDURE — 78306 BONE IMAGING WHOLE BODY: CPT | Mod: 26

## 2022-08-25 ENCOUNTER — TRANSCRIPTION ENCOUNTER (OUTPATIENT)
Age: 42
End: 2022-08-25

## 2023-01-17 ENCOUNTER — APPOINTMENT (OUTPATIENT)
Dept: INTERNAL MEDICINE | Facility: CLINIC | Age: 43
End: 2023-01-17

## 2023-04-13 ENCOUNTER — RX RENEWAL (OUTPATIENT)
Age: 43
End: 2023-04-13

## 2023-04-19 ENCOUNTER — RX RENEWAL (OUTPATIENT)
Age: 43
End: 2023-04-19

## 2023-04-21 ENCOUNTER — APPOINTMENT (OUTPATIENT)
Dept: INTERNAL MEDICINE | Facility: CLINIC | Age: 43
End: 2023-04-21
Payer: COMMERCIAL

## 2023-04-21 VITALS
RESPIRATION RATE: 15 BRPM | TEMPERATURE: 97.7 F | HEART RATE: 63 BPM | SYSTOLIC BLOOD PRESSURE: 149 MMHG | OXYGEN SATURATION: 99 % | WEIGHT: 184 LBS | HEIGHT: 67 IN | DIASTOLIC BLOOD PRESSURE: 83 MMHG | BODY MASS INDEX: 28.88 KG/M2

## 2023-04-21 DIAGNOSIS — F32.A DEPRESSION, UNSPECIFIED: ICD-10-CM

## 2023-04-21 PROCEDURE — 99212 OFFICE O/P EST SF 10 MIN: CPT

## 2023-04-24 PROBLEM — F32.A DEPRESSION: Status: ACTIVE | Noted: 2020-11-17

## 2023-04-24 NOTE — HISTORY OF PRESENT ILLNESS
[FreeTextEntry8] : Mr. ISIDRO ORTIZ  is    42 year male . He is a pt. of Dr Caicedo.\par Patient presents today for a refill on Paxil.\par Patient stated his symptoms of depression and anxiety are well controlled and stable on current medication he denied any side effect.\par \par

## 2023-04-24 NOTE — ASSESSMENT
[FreeTextEntry1] : Depression with anxiety:\par Patient is feeling stable and his symptoms are well controlled on Paxil 10 mg once a day\par Renewed prescription.\par He will schedule appointment for CPE.\par \par

## 2023-05-11 ENCOUNTER — RX CHANGE (OUTPATIENT)
Age: 43
End: 2023-05-11

## 2023-05-12 ENCOUNTER — APPOINTMENT (OUTPATIENT)
Dept: NEUROSURGERY | Facility: CLINIC | Age: 43
End: 2023-05-12
Payer: COMMERCIAL

## 2023-05-12 VITALS
WEIGHT: 173 LBS | OXYGEN SATURATION: 98 % | HEART RATE: 74 BPM | HEIGHT: 67 IN | SYSTOLIC BLOOD PRESSURE: 154 MMHG | DIASTOLIC BLOOD PRESSURE: 94 MMHG | BODY MASS INDEX: 27.15 KG/M2

## 2023-05-12 DIAGNOSIS — M79.18 MYALGIA, OTHER SITE: ICD-10-CM

## 2023-05-12 DIAGNOSIS — M54.9 DORSALGIA, UNSPECIFIED: ICD-10-CM

## 2023-05-12 DIAGNOSIS — F19.91 OTHER PSYCHOACTIVE SUBSTANCE USE, UNSPECIFIED, IN REMISSION: ICD-10-CM

## 2023-05-12 DIAGNOSIS — Z87.891 PERSONAL HISTORY OF NICOTINE DEPENDENCE: ICD-10-CM

## 2023-05-12 DIAGNOSIS — Z78.9 OTHER SPECIFIED HEALTH STATUS: ICD-10-CM

## 2023-05-12 PROCEDURE — 99215 OFFICE O/P EST HI 40 MIN: CPT

## 2023-05-12 NOTE — REVIEW OF SYSTEMS
[Numbness] : numbness [Tingling] : tingling [Difficulty Walking] : difficulty walking [Negative] : Heme/Lymph [Leg Weakness] : no leg weakness [de-identified] : back and right leg pain

## 2023-05-12 NOTE — CONSULT LETTER
[Dear  ___] : Dear  [unfilled], [Courtesy Letter:] : I had the pleasure of seeing your patient, [unfilled], in my office today. 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  The patient was seen in our office 2-1/2 years ago for lower back pain where an MRI revealed L4-L5 and L5-S1 small herniated disks.  Conservative treatment did resolve his symptoms at the time.  Today, the patient endorses a 2-week history of severe lower back pain into the right posterior thigh and down his groin and scrotal region.  The pain is a 10 out of 10.  There is numbness and tingling associated in his right buttock into his right groin with burning sensation into his scrotal region.  There is no saddle anesthesia there is no urinary dysfunction.  No dysuria.  No hematuria.  The pain is described as an electric sensation that runs from the lower back and down into the thigh up to his knee level.  Sitting standing and moving does exacerbate the pain.  The patient has no leg weakness.  No urine or bowel dysfunction.\par \par The patient is physically active at his current job and does attend to the gym on a routine weekly basis.  He stretches daily.  He is taking Advil and Motrin every day for the pain with very little relief.  In 19 2020 he did seek care through a chiropractor and physical therapy which was beneficial at that time.  \par \par Together I had a lengthy discussion about the specific dermatones involved with an L4 L5 and L5 S1 disc herniation that correlate to his symptoms.  I have reviewed his most recent MRI from 2020 showing a far lateral disc herniation at these 2 levels.  I used spine models as well to describe the findings on his MRI and the patient has good understanding.\par \par On neurologic examination, the patient is alert and oriented.  Appears well and in no distress.  Speech clear and and fluent.   No vibration, temperature, or sensory loss through out and pin prick normal.  Denies any sensory changes in his scrotal area, no numbness or tingling.  No dysmetria of LE.  Full strength in all muscle groups.  Reflexes are 4+ in both knees and ankles, equal and symmetric through out.  Plantar reflexes are flexor.  MINH are intact.  No abnormal extraneous movements.  No clonus.  Romberg is absent.   Gait is steady.   Tandem gait normal.   Able to walk on heels and toes with out difficulty.  Positive straight leg testing bilaterally.\par \par Taken together the patient's clinical symptoms and past history of a far lateral disc herniation, I have ordered an urgent MRI of the lumbar spine to assess for progression of the disc level.  The patient is aware of any red flags that could occur including weakness of the leg increased pain, or sensory changes in his genital area.  We discussed continuing daily exercises however he will avoid any lifting.  We reviewed body mechanics and limiting certain motions such as bending and twisting that could exacerbate his pain.  If the patient has any urinary symptoms he will go to the ED or call the office.  I have recommended the use of Aleve twice ad ay with food for pain.   We have discussed steroid use and the patient had an untoward reaction to this med years prior.  Once the MRI is complete he will call the office for image review.  He is welcome to return and have a formal evaluation with Dr. Markham.\par \par Thank you for very kindly including me in the evaluation and treatment of your patient.  Please do not hesitate to contact me should you have any concerns or questions regarding this evaluation or proposed follow-up treatment and evaluation plan.  [FreeTextEntry3] : Kimberly Lombardo, DNP, NP Nurse Practitioner Neurosurgery and Spine [facility] [place] Assistant  [employer] email: [email or web address]  [address] P- [phone] F- [phone]

## 2023-05-12 NOTE — RESULTS/DATA
[FreeTextEntry1] :    MR Lumbar Spine No Cont             Final  No Documents Attached       EXAM:  MR SPINE LUMBAR   PROCEDURE DATE:  08/22/2020    INTERPRETATION:  MR LUMBAR SPINE  HISTORY:  Lower back pain for about 2 months, radiating into the right buttock. Chronic pain that has increased.  TECHNIQUE:  Multiplanar MRI of the lumbar spine was performed without contrast using 5 sequences.  COMPARISON:  Radiographs dated 8/14/2020 and CT dated 2/13/2020.  FINDINGS:  OSSEOUS STRUCTURES   Fractures:  None.   Morphology: S1 is transitional   Alignment:  Mild thoracolumbar dextrocurvature.   Marrow Signal:  Maintained.  SPINAL CORD   Signal:  Normal.   Conus Medullaris:  Ends at L2.  DISC LEVELS T12-L1:  No spinal canal stenosis or neural foraminal narrowing.  L1-L2:  Mild loss of disc height is present with posterior disc osteophyte complex but no significant stenosis.  L2-L3: No spinal canal stenosis or neural foraminal narrowing.  L3-L4: No spinal canal stenosis or neural foraminal narrowing.  L4-L5: Mild loss of disc height is present with disc bulging lateralizing to the right but no central canal stenosis. Moderate right and mild left neural foraminal stenosis is present.  L5-S1: Mild loss of disc height is present with disc bulging but no central canal stenosis. Moderate right and mild left neural foraminal stenosis is present.  VISUALIZED SACROILIAC JOINTS Maintained.  SOFT TISSUES Unremarkable.  IMPRESSION: 1. Mild degenerative disc disease is present without central canal stenosis. 2. Moderate right and mild left neural foraminal stenosis is present at L4-L5 and L5-S1.      Ordered by: KRISSY MILLER       Collected/Examined: [id number] 11:26AM        Verified by: KRISSY MILLER [id number] 01:13PM         Result Communication: Discussed results with patient; Stage: Final         Performed at: [facility]       Resulted: [id number] 12:19PM       Last Updated: [id number] 01:13PM       Accession: [id number]

## 2023-05-12 NOTE — REASON FOR VISIT
[Follow-Up: _____] : a [unfilled] follow-up visit [Other: _____] : [unfilled] [FreeTextEntry1] : Right sided lumbar radiculopathy

## 2023-05-15 ENCOUNTER — APPOINTMENT (OUTPATIENT)
Dept: MRI IMAGING | Facility: CLINIC | Age: 43
End: 2023-05-15
Payer: COMMERCIAL

## 2023-05-15 ENCOUNTER — OUTPATIENT (OUTPATIENT)
Dept: OUTPATIENT SERVICES | Facility: HOSPITAL | Age: 43
LOS: 1 days | End: 2023-05-15
Payer: COMMERCIAL

## 2023-05-15 DIAGNOSIS — M54.9 DORSALGIA, UNSPECIFIED: ICD-10-CM

## 2023-05-15 PROCEDURE — 72148 MRI LUMBAR SPINE W/O DYE: CPT | Mod: 26

## 2023-05-15 PROCEDURE — 72148 MRI LUMBAR SPINE W/O DYE: CPT

## 2023-07-24 ENCOUNTER — RX RENEWAL (OUTPATIENT)
Age: 43
End: 2023-07-24

## 2023-08-14 ENCOUNTER — RX RENEWAL (OUTPATIENT)
Age: 43
End: 2023-08-14

## 2023-09-11 ENCOUNTER — APPOINTMENT (OUTPATIENT)
Dept: INTERNAL MEDICINE | Facility: CLINIC | Age: 43
End: 2023-09-11

## 2023-10-31 ENCOUNTER — NON-APPOINTMENT (OUTPATIENT)
Age: 43
End: 2023-10-31

## 2023-11-01 ENCOUNTER — RX RENEWAL (OUTPATIENT)
Age: 43
End: 2023-11-01

## 2023-11-16 ENCOUNTER — RX RENEWAL (OUTPATIENT)
Age: 43
End: 2023-11-16

## 2023-11-16 ENCOUNTER — APPOINTMENT (OUTPATIENT)
Dept: INTERNAL MEDICINE | Facility: CLINIC | Age: 43
End: 2023-11-16
Payer: COMMERCIAL

## 2023-11-16 ENCOUNTER — NON-APPOINTMENT (OUTPATIENT)
Age: 43
End: 2023-11-16

## 2023-11-16 VITALS — SYSTOLIC BLOOD PRESSURE: 156 MMHG | DIASTOLIC BLOOD PRESSURE: 94 MMHG

## 2023-11-16 VITALS
DIASTOLIC BLOOD PRESSURE: 113 MMHG | SYSTOLIC BLOOD PRESSURE: 170 MMHG | BODY MASS INDEX: 28.88 KG/M2 | HEART RATE: 97 BPM | HEIGHT: 67 IN | TEMPERATURE: 98.1 F | RESPIRATION RATE: 15 BRPM | WEIGHT: 184 LBS | OXYGEN SATURATION: 97 %

## 2023-11-16 DIAGNOSIS — K12.0 RECURRENT ORAL APHTHAE: ICD-10-CM

## 2023-11-16 DIAGNOSIS — Z00.00 ENCOUNTER FOR GENERAL ADULT MEDICAL EXAMINATION W/OUT ABNORMAL FINDINGS: ICD-10-CM

## 2023-11-16 PROCEDURE — 93000 ELECTROCARDIOGRAM COMPLETE: CPT

## 2023-11-16 PROCEDURE — 36415 COLL VENOUS BLD VENIPUNCTURE: CPT

## 2023-11-16 PROCEDURE — 99396 PREV VISIT EST AGE 40-64: CPT | Mod: 25

## 2023-11-16 RX ORDER — IBUPROFEN 800 MG/1
800 TABLET, FILM COATED ORAL 3 TIMES DAILY
Qty: 42 | Refills: 0 | Status: DISCONTINUED | COMMUNITY
Start: 2023-07-12 | End: 2023-11-16

## 2023-11-16 RX ORDER — CYCLOBENZAPRINE HYDROCHLORIDE 5 MG/1
5 TABLET, FILM COATED ORAL 3 TIMES DAILY
Qty: 30 | Refills: 0 | Status: DISCONTINUED | COMMUNITY
Start: 2023-07-12 | End: 2023-11-16

## 2023-11-16 RX ORDER — MELOXICAM 15 MG/1
15 TABLET ORAL
Qty: 30 | Refills: 1 | Status: DISCONTINUED | COMMUNITY
Start: 2021-11-03 | End: 2023-11-16

## 2023-11-17 LAB
25(OH)D3 SERPL-MCNC: 45.4 NG/ML
ALBUMIN SERPL ELPH-MCNC: 4.6 G/DL
ALP BLD-CCNC: 184 U/L
ALT SERPL-CCNC: 173 U/L
ANION GAP SERPL CALC-SCNC: 11 MMOL/L
APPEARANCE: CLEAR
AST SERPL-CCNC: 91 U/L
BASOPHILS # BLD AUTO: 0.03 K/UL
BASOPHILS NFR BLD AUTO: 0.5 %
BILIRUB SERPL-MCNC: 1.2 MG/DL
BILIRUBIN URINE: NEGATIVE
BLOOD URINE: NEGATIVE
BUN SERPL-MCNC: 18 MG/DL
CALCIUM SERPL-MCNC: 9.7 MG/DL
CHLORIDE SERPL-SCNC: 100 MMOL/L
CHOLEST SERPL-MCNC: 300 MG/DL
CO2 SERPL-SCNC: 27 MMOL/L
COLOR: YELLOW
CREAT SERPL-MCNC: 1.13 MG/DL
EGFR: 83 ML/MIN/1.73M2
EOSINOPHIL # BLD AUTO: 0.07 K/UL
EOSINOPHIL NFR BLD AUTO: 1.3 %
ESTIMATED AVERAGE GLUCOSE: 117 MG/DL
GLUCOSE QUALITATIVE U: NEGATIVE MG/DL
GLUCOSE SERPL-MCNC: 105 MG/DL
HBA1C MFR BLD HPLC: 5.7 %
HCT VFR BLD CALC: 53.1 %
HDLC SERPL-MCNC: 50 MG/DL
HGB BLD-MCNC: 17.2 G/DL
HIV1+2 AB SPEC QL IA.RAPID: NONREACTIVE
IMM GRANULOCYTES NFR BLD AUTO: 0.5 %
KETONES URINE: NEGATIVE MG/DL
LDLC SERPL CALC-MCNC: 229 MG/DL
LEUKOCYTE ESTERASE URINE: NEGATIVE
LYMPHOCYTES # BLD AUTO: 1.1 K/UL
LYMPHOCYTES NFR BLD AUTO: 19.6 %
MAN DIFF?: NORMAL
MCHC RBC-ENTMCNC: 27.7 PG
MCHC RBC-ENTMCNC: 32.4 GM/DL
MCV RBC AUTO: 85.6 FL
MONOCYTES # BLD AUTO: 0.43 K/UL
MONOCYTES NFR BLD AUTO: 7.7 %
NEUTROPHILS # BLD AUTO: 3.94 K/UL
NEUTROPHILS NFR BLD AUTO: 70.4 %
NITRITE URINE: NEGATIVE
NONHDLC SERPL-MCNC: 251 MG/DL
PH URINE: 7.5
PLATELET # BLD AUTO: 254 K/UL
POTASSIUM SERPL-SCNC: 5.6 MMOL/L
PROT SERPL-MCNC: 7.2 G/DL
PROTEIN URINE: NORMAL MG/DL
RBC # BLD: 6.2 M/UL
RBC # FLD: 13.8 %
SODIUM SERPL-SCNC: 137 MMOL/L
SPECIFIC GRAVITY URINE: 1.01
T4 FREE SERPL-MCNC: 1.2 NG/DL
TRIGL SERPL-MCNC: 121 MG/DL
TSH SERPL-ACNC: 1.06 UIU/ML
UROBILINOGEN URINE: 1 MG/DL
WBC # FLD AUTO: 5.6 K/UL

## 2023-11-20 LAB
HCV AB SER QL: NONREACTIVE
HCV S/CO RATIO: 0.1 S/CO

## 2023-12-06 ENCOUNTER — LABORATORY RESULT (OUTPATIENT)
Age: 43
End: 2023-12-06

## 2023-12-06 ENCOUNTER — APPOINTMENT (OUTPATIENT)
Dept: INTERNAL MEDICINE | Facility: CLINIC | Age: 43
End: 2023-12-06
Payer: COMMERCIAL

## 2023-12-06 VITALS — DIASTOLIC BLOOD PRESSURE: 90 MMHG | SYSTOLIC BLOOD PRESSURE: 142 MMHG

## 2023-12-06 VITALS
TEMPERATURE: 97.2 F | OXYGEN SATURATION: 98 % | HEART RATE: 88 BPM | HEIGHT: 67 IN | RESPIRATION RATE: 16 BRPM | WEIGHT: 180 LBS | BODY MASS INDEX: 28.25 KG/M2

## 2023-12-06 DIAGNOSIS — R74.8 ABNORMAL LEVELS OF OTHER SERUM ENZYMES: ICD-10-CM

## 2023-12-06 DIAGNOSIS — E87.5 HYPERKALEMIA: ICD-10-CM

## 2023-12-06 PROCEDURE — 36415 COLL VENOUS BLD VENIPUNCTURE: CPT

## 2023-12-06 PROCEDURE — 99214 OFFICE O/P EST MOD 30 MIN: CPT | Mod: 25

## 2023-12-07 ENCOUNTER — NON-APPOINTMENT (OUTPATIENT)
Age: 43
End: 2023-12-07

## 2023-12-07 LAB
ALBUMIN SERPL ELPH-MCNC: 4.8 G/DL
ALP BLD-CCNC: 155 U/L
ALT SERPL-CCNC: 80 U/L
ANION GAP SERPL CALC-SCNC: 18 MMOL/L
AST SERPL-CCNC: 53 U/L
BASOPHILS # BLD AUTO: 0.03 K/UL
BASOPHILS NFR BLD AUTO: 0.4 %
BILIRUB SERPL-MCNC: 0.3 MG/DL
BUN SERPL-MCNC: 17 MG/DL
CALCIUM SERPL-MCNC: 9.8 MG/DL
CHLORIDE SERPL-SCNC: 99 MMOL/L
CO2 SERPL-SCNC: 23 MMOL/L
CREAT SERPL-MCNC: 1.11 MG/DL
EGFR: 84 ML/MIN/1.73M2
EOSINOPHIL # BLD AUTO: 0.07 K/UL
EOSINOPHIL NFR BLD AUTO: 0.9 %
FERRITIN SERPL-MCNC: 44 NG/ML
FOLATE SERPL-MCNC: >20 NG/ML
GLUCOSE SERPL-MCNC: 82 MG/DL
HBV E AB SER QL: NONREACTIVE
HBV E AG SER QL: NONREACTIVE
HBV SURFACE AB SER QL: NONREACTIVE
HBV SURFACE AB SERPL IA-ACNC: <3 MIU/ML
HBV SURFACE AG SER QL: NONREACTIVE
HCT VFR BLD CALC: 51.1 %
HEPATITIS A IGG ANTIBODY: NONREACTIVE
HEPB DNA PCR INT: NOT DETECTED
HEPB DNA PCR LOG: NOT DETECTED LOGIU/ML
HGB BLD-MCNC: 17.2 G/DL
IMM GRANULOCYTES NFR BLD AUTO: 0.1 %
IRON SATN MFR SERPL: 11 %
IRON SERPL-MCNC: 49 UG/DL
LYMPHOCYTES # BLD AUTO: 1.16 K/UL
LYMPHOCYTES NFR BLD AUTO: 15.5 %
MAN DIFF?: NORMAL
MCHC RBC-ENTMCNC: 27.3 PG
MCHC RBC-ENTMCNC: 33.7 GM/DL
MCV RBC AUTO: 81.1 FL
MONOCYTES # BLD AUTO: 0.45 K/UL
MONOCYTES NFR BLD AUTO: 6 %
NEUTROPHILS # BLD AUTO: 5.74 K/UL
NEUTROPHILS NFR BLD AUTO: 77.1 %
PLATELET # BLD AUTO: 238 K/UL
POTASSIUM SERPL-SCNC: 4.8 MMOL/L
PROT SERPL-MCNC: 7.4 G/DL
RBC # BLD: 6.3 M/UL
RBC # FLD: 13.7 %
SODIUM SERPL-SCNC: 139 MMOL/L
TIBC SERPL-MCNC: 448 UG/DL
TRANSFERRIN SERPL-MCNC: 350 MG/DL
UIBC SERPL-MCNC: 399 UG/DL
VIT B12 SERPL-MCNC: 633 PG/ML
WBC # FLD AUTO: 7.46 K/UL

## 2024-01-09 ENCOUNTER — LABORATORY RESULT (OUTPATIENT)
Age: 44
End: 2024-01-09

## 2024-01-09 ENCOUNTER — APPOINTMENT (OUTPATIENT)
Dept: INTERNAL MEDICINE | Facility: CLINIC | Age: 44
End: 2024-01-09
Payer: COMMERCIAL

## 2024-01-09 VITALS
BODY MASS INDEX: 27.31 KG/M2 | OXYGEN SATURATION: 98 % | SYSTOLIC BLOOD PRESSURE: 154 MMHG | DIASTOLIC BLOOD PRESSURE: 105 MMHG | HEART RATE: 77 BPM | TEMPERATURE: 97.6 F | WEIGHT: 174 LBS | RESPIRATION RATE: 16 BRPM | HEIGHT: 67 IN

## 2024-01-09 VITALS — SYSTOLIC BLOOD PRESSURE: 130 MMHG | DIASTOLIC BLOOD PRESSURE: 89 MMHG

## 2024-01-09 DIAGNOSIS — D75.1 SECONDARY POLYCYTHEMIA: ICD-10-CM

## 2024-01-09 PROCEDURE — 36415 COLL VENOUS BLD VENIPUNCTURE: CPT

## 2024-01-09 PROCEDURE — 99214 OFFICE O/P EST MOD 30 MIN: CPT | Mod: 25

## 2024-01-09 NOTE — ASSESSMENT
[FreeTextEntry1] : Physical Exam: Constitutional: No acute distress, well appearing HEENT: Normocephalic, atraumatic Neck: supple Cardiac: Regular rate and rhythm, No murmurs Pulmonary: No respiratory distress, Lungs clear to auscultation bilaterally, no wheezing, rales, or rhonchi Abdomen: Soft, non-tender, non-distended, no guarding, normal bowel sounds Vascular: No peripheral edema Neurology: Coordination grossly intact, no focal deficits Psychiatric: Alert and oriented x3, normal mood      A/P: HTN amlodipine increased to 5mg in december today is 1 month f/u bp elevated at triage and improved on repeat brought bp log- normal, improved values at home - rec reducing caffeine, salt intake - c/w amlodipine 5mg - f/u 3 months or sooner as needed. advised to bring bp machine with him at next visit  HLD fasting cholesterol very elevated on labs from november, has increased from previous we discussed that this in combination with HTN puts him at high risk for ASCVD events. repeat LFTs still elevated but improvement in december , he wanted to discuss with cardiologist prior to starting statin therapy. was unable to see cardiology, provided referral again today - will check LFTs today. if improvement/ stable, will plan on sending atorvastatin 40mg daily and repeat lipids in 2-3 months - lifestyle modif including healthy, low fat diet, exercise regularly  abnormal ekg asymptomatic - he will see cardiology  elev LFT on labs from nov no pain in abdomen hep panel neg LFTs still elevated but with improvement in december - will check today again for trend - if persists, will refer to GI and get abdominal sono  polycythema: persistent- denies snoring. feels refreshed in the morning. - was prev referred to see heme/onc - will repeat cbc today- bw drawn in office  preDM; a1c 5.7 recent labs from nov - lifestyle modif including healthy, low carb diet  anxiety mine miller is his therapist in [place] going through divorce and been very stressed, has a child and also moving [facility] is feeling better on this dosage - will c/w paxil 20mg daily  GERD: controlled - c/w omeprazole 40mg.

## 2024-01-09 NOTE — HISTORY OF PRESENT ILLNESS
[FreeTextEntry1] : f/u [de-identified] : ISIDRO ORTIZ is a 43 year old male with PMHx GERD, HLD, fatty liver, anxiety, presenting for f/u. bp has been well controlled at home.

## 2024-01-11 LAB
ALBUMIN SERPL ELPH-MCNC: 4.7 G/DL
ALP BLD-CCNC: 136 U/L
ALT SERPL-CCNC: 50 U/L
ANION GAP SERPL CALC-SCNC: 11 MMOL/L
AST SERPL-CCNC: 34 U/L
BASOPHILS # BLD AUTO: 0.04 K/UL
BASOPHILS NFR BLD AUTO: 0.6 %
BILIRUB SERPL-MCNC: 0.5 MG/DL
BUN SERPL-MCNC: 18 MG/DL
CALCIUM SERPL-MCNC: 9.8 MG/DL
CHLORIDE SERPL-SCNC: 101 MMOL/L
CO2 SERPL-SCNC: 29 MMOL/L
CREAT SERPL-MCNC: 1.22 MG/DL
EGFR: 75 ML/MIN/1.73M2
EOSINOPHIL # BLD AUTO: 0.05 K/UL
EOSINOPHIL NFR BLD AUTO: 0.8 %
GLUCOSE SERPL-MCNC: 68 MG/DL
HCT VFR BLD CALC: 48.3 %
HGB BLD-MCNC: 16.2 G/DL
IMM GRANULOCYTES NFR BLD AUTO: 0.3 %
LYMPHOCYTES # BLD AUTO: 1.37 K/UL
LYMPHOCYTES NFR BLD AUTO: 20.6 %
MAN DIFF?: NORMAL
MCHC RBC-ENTMCNC: 28.6 PG
MCHC RBC-ENTMCNC: 33.5 GM/DL
MCV RBC AUTO: 85.2 FL
MONOCYTES # BLD AUTO: 0.58 K/UL
MONOCYTES NFR BLD AUTO: 8.7 %
NEUTROPHILS # BLD AUTO: 4.58 K/UL
NEUTROPHILS NFR BLD AUTO: 69 %
PLATELET # BLD AUTO: 193 K/UL
POTASSIUM SERPL-SCNC: 4.9 MMOL/L
PROT SERPL-MCNC: 7 G/DL
RBC # BLD: 5.67 M/UL
RBC # FLD: 13.8 %
SODIUM SERPL-SCNC: 140 MMOL/L
WBC # FLD AUTO: 6.64 K/UL

## 2024-01-24 ENCOUNTER — APPOINTMENT (OUTPATIENT)
Dept: CARDIOLOGY | Facility: CLINIC | Age: 44
End: 2024-01-24
Payer: COMMERCIAL

## 2024-01-24 VITALS
OXYGEN SATURATION: 98 % | WEIGHT: 175 LBS | SYSTOLIC BLOOD PRESSURE: 150 MMHG | HEIGHT: 67 IN | BODY MASS INDEX: 27.47 KG/M2 | HEART RATE: 78 BPM | DIASTOLIC BLOOD PRESSURE: 102 MMHG

## 2024-01-24 DIAGNOSIS — R94.31 ABNORMAL ELECTROCARDIOGRAM [ECG] [EKG]: ICD-10-CM

## 2024-01-24 PROCEDURE — 93000 ELECTROCARDIOGRAM COMPLETE: CPT

## 2024-01-24 PROCEDURE — 99204 OFFICE O/P NEW MOD 45 MIN: CPT | Mod: 25

## 2024-01-24 NOTE — PHYSICAL EXAM
[Well Developed] : well developed [No Acute Distress] : no acute distress [Well Nourished] : well nourished [Normal Conjunctiva] : normal conjunctiva [Normal Venous Pressure] : normal venous pressure [No Carotid Bruit] : no carotid bruit [Normal S1, S2] : normal S1, S2 [No Murmur] : no murmur [No Gallop] : no gallop [No Rub] : no rub [Clear Lung Fields] : clear lung fields [Good Air Entry] : good air entry [No Respiratory Distress] : no respiratory distress  [Soft] : abdomen soft [Non Tender] : non-tender [Normal Bowel Sounds] : normal bowel sounds [No Masses/organomegaly] : no masses/organomegaly [Normal Gait] : normal gait [No Edema] : no edema [No Cyanosis] : no cyanosis [No Clubbing] : no clubbing [No Varicosities] : no varicosities [No Rash] : no rash [No Skin Lesions] : no skin lesions [Moves all extremities] : moves all extremities [No Focal Deficits] : no focal deficits [Normal Speech] : normal speech [Alert and Oriented] : alert and oriented [Normal memory] : normal memory

## 2024-01-24 NOTE — HISTORY OF PRESENT ILLNESS
[FreeTextEntry1] : Pt is a 42 y/o M who is referred here today by their PCP for evaluation.  Pt has PMH HTN, HLD, previous IVDA/Etoh abuse, fatty liver, anxiety.  He is feeling well overall - denies CP, SOB, diaphoresis, palpitations, dizziness, syncope, LE edema, PND, orthopnea.  He mentions that he has not started statin Home 's/70's Previous cardiologist [facility]   Family hx: mother HTN, sister valve surgery 48 Smoking status: quit 13 yrs  ETOH abuse in the past quit 2013 IV drug abuse quit 2013 Current exercise: 5x/week cardio  Daily water intake: 60-80 oz Daily caffeine intake: 1 espresso OTC medications: advil PRN Previous hospitalizations/surgeries: ER palpitations, hand surgery 1998 - no problems with anesthesia

## 2024-01-24 NOTE — DISCUSSION/SUMMARY
[EKG obtained to assist in diagnosis and management of assessed problem(s)] : EKG obtained to assist in diagnosis and management of assessed problem(s) [FreeTextEntry1] : Pt is a 42 y/o M PMH HTN, HLD, previous IVDA/Etoh abuse, fatty liver, anxiety.   HLD: recommended to start statin - chol VERY high Advised lifestyle modifications  check CCTA to eval for CAD check CUS  HTN: elevated today but well controlled at home - advised to bring machine in next OV c/w amlodipine Discussed the long-term health risks of uncontrolled BP.  Advised low salt diet, regular exercise, maintaining ideal weight. Encouraged pt to check BP at home and keep journal   Pt will return in 6 mnths or sooner as needed but I encouraged communication via phone or portal if necessary.  Most recent available lab results were reviewed with pt. The described plan was discussed with the pt.  All questions and concerns were addressed to the best of my knowledge.

## 2024-02-12 ENCOUNTER — RX RENEWAL (OUTPATIENT)
Age: 44
End: 2024-02-12

## 2024-02-14 ENCOUNTER — RX RENEWAL (OUTPATIENT)
Age: 44
End: 2024-02-14

## 2024-02-14 RX ORDER — OMEPRAZOLE 40 MG/1
40 CAPSULE, DELAYED RELEASE ORAL
Qty: 90 | Refills: 0 | Status: ACTIVE | COMMUNITY
Start: 2020-01-29 | End: 1900-01-01

## 2024-03-07 ENCOUNTER — APPOINTMENT (OUTPATIENT)
Dept: INTERNAL MEDICINE | Facility: CLINIC | Age: 44
End: 2024-03-07

## 2024-04-16 ENCOUNTER — APPOINTMENT (OUTPATIENT)
Dept: INTERNAL MEDICINE | Facility: CLINIC | Age: 44
End: 2024-04-16

## 2024-04-22 ENCOUNTER — RX RENEWAL (OUTPATIENT)
Age: 44
End: 2024-04-22

## 2024-04-24 RX ORDER — ATORVASTATIN CALCIUM 40 MG/1
40 TABLET, FILM COATED ORAL
Qty: 90 | Refills: 0 | Status: ACTIVE | COMMUNITY
Start: 2024-01-11 | End: 1900-01-01

## 2024-05-01 ENCOUNTER — APPOINTMENT (OUTPATIENT)
Dept: INTERNAL MEDICINE | Facility: CLINIC | Age: 44
End: 2024-05-01
Payer: COMMERCIAL

## 2024-05-01 VITALS
OXYGEN SATURATION: 98 % | DIASTOLIC BLOOD PRESSURE: 85 MMHG | TEMPERATURE: 97.7 F | WEIGHT: 174 LBS | BODY MASS INDEX: 27.31 KG/M2 | SYSTOLIC BLOOD PRESSURE: 140 MMHG | HEIGHT: 67 IN | HEART RATE: 86 BPM

## 2024-05-01 DIAGNOSIS — R74.8 ABNORMAL LEVELS OF OTHER SERUM ENZYMES: ICD-10-CM

## 2024-05-01 DIAGNOSIS — F41.9 ANXIETY DISORDER, UNSPECIFIED: ICD-10-CM

## 2024-05-01 PROCEDURE — 99214 OFFICE O/P EST MOD 30 MIN: CPT

## 2024-05-01 RX ORDER — AMLODIPINE BESYLATE 10 MG/1
10 TABLET ORAL
Qty: 90 | Refills: 3 | Status: ACTIVE | COMMUNITY
Start: 2023-11-16 | End: 1900-01-01

## 2024-05-02 ENCOUNTER — RX RENEWAL (OUTPATIENT)
Age: 44
End: 2024-05-02

## 2024-05-06 PROBLEM — F41.9 ANXIETY: Status: ACTIVE | Noted: 2019-04-02

## 2024-05-06 PROBLEM — R74.8 ELEVATED LIVER ENZYMES: Status: ACTIVE | Noted: 2023-12-06

## 2024-05-06 NOTE — HEALTH RISK ASSESSMENT
[Never (0 pts)] : Never (0 points) [No] : In the past 12 months have you used drugs other than those required for medical reasons? No [No falls in past year] : Patient reported no falls in the past year [0] : 1) Little interest or pleasure doing things: Not at all (0) [1] : 2) Feeling down, depressed, or hopeless for several days (1) [PHQ-2 Negative - No further assessment needed] : PHQ-2 Negative - No further assessment needed [Former] : Former [20 or more] : 20 or more [< 15 Years] : < 15 Years [Audit-CScore] : 0 [de-identified] : opioid abuse in the past, quit 13.5 years ago [[id number]] : 1 [de-identified] : 2PPD for 10 years and quit 13.5 years ago

## 2024-05-06 NOTE — PLAN
[FreeTextEntry1] : 43M PMH HTN, HLD, previous IVDA/Etoh abuse (13.5 years clean), fatty liver, anxiety presents for follow up for chronic issues.  # HTN - BP in-office 140/85 and similar on repeat - shows me home pressures on his phone with 120/80s - denies any blurry vision, headaches, chest pain, shortness of breath, edema.  - under a lot of stress with wife's recent death and legal matters but has good support at home.  - saw cardiology a few months ago and had a workup with mild to moderate MR but otherwise normal echo, normal carotid Doppler and NSR EKG with no ischemic changes.  - screen positive for LON on screening but refuses LON workup despite encouragement. - continue amlodipine 10mg daily - cmp ordered  # elevated LFTs - 1/2024 with ALT 50 and Alk phosph 136, still high but improved from prior - normal hepatitis panel and bili from 12/2024 - 8/2022 with normal liver parenchyma - repeat cmp today   # HLD - 11/2023  and cholesterol 300 - strongly recommended statin by me and previously by PCP and cardio, patient declines despite encouragement - fear of possible myopathy - wants to resume lifestyle modifications for now - lipids ordered  # anxiety - under a lot of stress with wife's recent death and legal matters but has good support at home.  - continue paroxetine  # GERD - continue omeprazole

## 2024-05-06 NOTE — REVIEW OF SYSTEMS
[Anxiety] : anxiety [Negative] : Respiratory [Suicidal] : not suicidal [Insomnia] : no insomnia [Depression] : no depression

## 2024-05-06 NOTE — HISTORY OF PRESENT ILLNESS
[Other: _____] : [unfilled] [de-identified] : 43M PMH HTN, HLD, previous IVDA/Etoh abuse (13.5 years clean), fatty liver, anxiety presents for follow up for chronic issues.  For HTN, patient reports he has white coat hypertension, with high blood pressures in the office and 120/80s on home cuff shown with pictures on his phone. He denies any blurry vision, headaches, chest pain, shortness of breath, edema. He states that his wife  last week from cancer while undergoing a divorce with him. Now he is dealing with some leagl matters regarding life insurance and their two small children. He is under alot of stress but has good support at home. He is on [id number] mg daily for now. He saw cardiology a few months ago and had a workup with mild to moderate MR but otherwise normal echo, normal carotid Doppler and NSR EKG with no ischemic changes. He was also recommended to take a statin given his high cholesterol, but he is refusing due to potential side effects of myopathy which he doesn't want to risk having. He did screen positive for LON on screening but refuses LON workup despite encouragement.  For anxiety he takes paroxetine. He takes omeprazole for GERD.

## 2024-05-30 ENCOUNTER — APPOINTMENT (OUTPATIENT)
Dept: CARDIOLOGY | Facility: CLINIC | Age: 44
End: 2024-05-30
Payer: COMMERCIAL

## 2024-05-30 ENCOUNTER — NON-APPOINTMENT (OUTPATIENT)
Age: 44
End: 2024-05-30

## 2024-05-30 VITALS
HEART RATE: 105 BPM | SYSTOLIC BLOOD PRESSURE: 150 MMHG | DIASTOLIC BLOOD PRESSURE: 88 MMHG | BODY MASS INDEX: 27.41 KG/M2 | OXYGEN SATURATION: 95 % | WEIGHT: 175 LBS

## 2024-05-30 PROCEDURE — 99214 OFFICE O/P EST MOD 30 MIN: CPT | Mod: 25

## 2024-05-30 PROCEDURE — 93000 ELECTROCARDIOGRAM COMPLETE: CPT

## 2024-05-30 NOTE — DISCUSSION/SUMMARY
[EKG obtained to assist in diagnosis and management of assessed problem(s)] : EKG obtained to assist in diagnosis and management of assessed problem(s) [FreeTextEntry1] : Pt is a 44 y/o M PMH HTN, HLD, previous IVDA/Etoh abuse, fatty liver, anxiety.   HLD: recommended to start statin - chol VERY high - he does not want to at this time Advised lifestyle modifications  check CCTA to eval for CAD (he did not do this after last OV)  HTN: elevated today but well controlled at home - advised to bring machine in next OV c/w amlodipine 10mg qd Discussed the long-term health risks of uncontrolled BP.  Advised low salt diet, regular exercise, maintaining ideal weight. Encouraged pt to check BP at home and keep journal   Pt will return in 6-12 mnths or sooner as needed but I encouraged communication via phone or portal if necessary.  Most recent available lab results were reviewed with pt. The described plan was discussed with the pt.  All questions and concerns were addressed to the best of my knowledge.

## 2024-05-30 NOTE — HISTORY OF PRESENT ILLNESS
[FreeTextEntry1] : Pt is a 42 y/o M PMH HTN, HLD, previous IVDA/Etoh abuse, fatty liver, anxiety.    He is feeling well overall - denies CP, SOB, diaphoresis, palpitations, dizziness, syncope, LE edema, PND, orthopnea.  His wife passed away 04/2024 from breast Ca He mentions that he has not started statin - he does not want to start as he is nervous about SE Home 's/70's Previous cardiologist [facility] [place]  TTE 01/2024 EF 56%, mild LVH, mild-mod MR CUS 01/2024 wnl Nuc stress test 02/2020 normal myocardial perfusion   Family hx: mother HTN, sister valve surgery 48 Smoking status: quit 13 yrs  ETOH abuse in the past quit 2013 IV drug abuse quit 2013 Current exercise: 5x/week cardio  Daily water intake: 60-80 oz Daily caffeine intake: 1 espresso OTC medications: advil PRN Previous hospitalizations/surgeries: ER palpitations, hand surgery 1998 - no problems with anesthesia   
no

## 2024-06-04 ENCOUNTER — NON-APPOINTMENT (OUTPATIENT)
Age: 44
End: 2024-06-04

## 2024-06-06 RX ORDER — PAROXETINE HYDROCHLORIDE 20 MG/1
20 TABLET, FILM COATED ORAL DAILY
Qty: 90 | Refills: 0 | Status: ACTIVE | COMMUNITY
Start: 2020-04-01 | End: 1900-01-01

## 2024-06-12 ENCOUNTER — APPOINTMENT (OUTPATIENT)
Dept: INTERNAL MEDICINE | Facility: CLINIC | Age: 44
End: 2024-06-12
Payer: COMMERCIAL

## 2024-06-12 VITALS
BODY MASS INDEX: 29.35 KG/M2 | WEIGHT: 187 LBS | TEMPERATURE: 97.6 F | OXYGEN SATURATION: 98 % | HEART RATE: 94 BPM | HEIGHT: 67 IN

## 2024-06-12 VITALS — SYSTOLIC BLOOD PRESSURE: 140 MMHG | DIASTOLIC BLOOD PRESSURE: 84 MMHG

## 2024-06-12 DIAGNOSIS — R30.0 DYSURIA: ICD-10-CM

## 2024-06-12 DIAGNOSIS — I10 ESSENTIAL (PRIMARY) HYPERTENSION: ICD-10-CM

## 2024-06-12 PROCEDURE — 99214 OFFICE O/P EST MOD 30 MIN: CPT

## 2024-06-12 PROCEDURE — G2211 COMPLEX E/M VISIT ADD ON: CPT | Mod: NC,1L

## 2024-06-15 ENCOUNTER — LABORATORY RESULT (OUTPATIENT)
Age: 44
End: 2024-06-15

## 2024-06-16 ENCOUNTER — NON-APPOINTMENT (OUTPATIENT)
Age: 44
End: 2024-06-16

## 2024-06-16 PROBLEM — I10 ELEVATED BLOOD PRESSURE READING IN OFFICE WITH WHITE COAT SYNDROME, WITH DIAGNOSIS OF HYPERTENSION: Status: ACTIVE | Noted: 2024-06-16

## 2024-06-16 PROBLEM — R30.0 DYSURIA: Status: ACTIVE | Noted: 2024-06-12

## 2024-06-16 RX ORDER — CEFPODOXIME PROXETIL 200 MG/1
200 TABLET, FILM COATED ORAL
Qty: 14 | Refills: 0 | Status: DISCONTINUED | COMMUNITY
Start: 2024-06-05

## 2024-06-16 NOTE — HISTORY OF PRESENT ILLNESS
[FreeTextEntry8] : ISIDRO ORTIZ is a 43 year M who presents today after an Urgent Care visit. on 6/5/24. He was evaluated for UTI complaints. He complained of + urinary frequency associated with dysuria. Urgent care treated with a 1 week course of Cefpodoxime and rerred him to urology. He felt better and was told by Urgent Care he was negative for a UTI. He would like me to review the Urgent Care findings. I was able to verify that both GC and Chlamydia PCR in urine are negative. [de-identified] : hyperlipidemias

## 2024-06-16 NOTE — PHYSICAL EXAM
[Normal] : no acute distress, well nourished, well developed and well-appearing [Normal Sclera/Conjunctiva] : normal sclera/conjunctiva [Normal Outer Ear/Nose] : the outer ears and nose were normal in appearance [No Respiratory Distress] : no respiratory distress  [No Accessory Muscle Use] : no accessory muscle use [Clear to Auscultation] : lungs were clear to auscultation bilaterally [Normal Rate] : normal rate  [Normal S1, S2] : normal S1 and S2 [Regular Rhythm] : with a regular rhythm [No CVA Tenderness] : no CVA  tenderness [Speech Grossly Normal] : speech grossly normal [Normal Affect] : the affect was normal [Alert and Oriented x3] : oriented to person, place, and time [Normal Mood] : the mood was normal [de-identified] : trivial suprapubic tenderness on deep palpation.

## 2024-06-16 NOTE — HISTORY OF PRESENT ILLNESS
[FreeTextEntry8] : ISIDRO ORTIZ is a 43 year M who presents today after an Urgent Care visit. on 6/5/24. He was evaluated for UTI complaints. He complained of + urinary frequency associated with dysuria. Urgent care treated with a 1 week course of Cefpodoxime and rerred him to urology. He felt better and was told by Urgent Care he was negative for a UTI. He would like me to review the Urgent Care findings. I was able to verify that both GC and Chlamydia PCR in urine are negative. [de-identified] : hyperlipidemias

## 2024-06-16 NOTE — PHYSICAL EXAM
[Normal] : no acute distress, well nourished, well developed and well-appearing [Normal Sclera/Conjunctiva] : normal sclera/conjunctiva [Normal Outer Ear/Nose] : the outer ears and nose were normal in appearance [No Respiratory Distress] : no respiratory distress  [No Accessory Muscle Use] : no accessory muscle use [Clear to Auscultation] : lungs were clear to auscultation bilaterally [Normal Rate] : normal rate  [Regular Rhythm] : with a regular rhythm [Normal S1, S2] : normal S1 and S2 [No CVA Tenderness] : no CVA  tenderness [Speech Grossly Normal] : speech grossly normal [Normal Affect] : the affect was normal [Alert and Oriented x3] : oriented to person, place, and time [Normal Mood] : the mood was normal [de-identified] : trivial suprapubic tenderness on deep palpation.

## 2024-06-17 ENCOUNTER — EMERGENCY (EMERGENCY)
Facility: HOSPITAL | Age: 44
LOS: 1 days | Discharge: LEFT BEFORE DISCHARGE | End: 2024-06-17
Payer: COMMERCIAL

## 2024-06-17 VITALS
HEIGHT: 66 IN | OXYGEN SATURATION: 98 % | WEIGHT: 179.9 LBS | RESPIRATION RATE: 20 BRPM | HEART RATE: 101 BPM | TEMPERATURE: 98 F | SYSTOLIC BLOOD PRESSURE: 146 MMHG | DIASTOLIC BLOOD PRESSURE: 93 MMHG

## 2024-06-17 PROCEDURE — L9991: CPT

## 2024-06-19 ENCOUNTER — APPOINTMENT (OUTPATIENT)
Dept: UROLOGY | Facility: CLINIC | Age: 44
End: 2024-06-19
Payer: COMMERCIAL

## 2024-06-19 VITALS
DIASTOLIC BLOOD PRESSURE: 80 MMHG | HEIGHT: 67 IN | OXYGEN SATURATION: 97 % | HEART RATE: 100 BPM | RESPIRATION RATE: 16 BRPM | BODY MASS INDEX: 29.35 KG/M2 | WEIGHT: 187 LBS | SYSTOLIC BLOOD PRESSURE: 135 MMHG

## 2024-06-19 DIAGNOSIS — N20.0 CALCULUS OF KIDNEY: ICD-10-CM

## 2024-06-19 PROCEDURE — 99214 OFFICE O/P EST MOD 30 MIN: CPT

## 2024-06-20 ENCOUNTER — APPOINTMENT (OUTPATIENT)
Dept: UROLOGY | Facility: CLINIC | Age: 44
End: 2024-06-20

## 2024-06-20 LAB
APPEARANCE: ABNORMAL
BILIRUBIN URINE: NEGATIVE
BLOOD URINE: NEGATIVE
COLOR: YELLOW
GLUCOSE QUALITATIVE U: NEGATIVE MG/DL
KETONES URINE: NEGATIVE MG/DL
LEUKOCYTE ESTERASE URINE: ABNORMAL
NITRITE URINE: NEGATIVE
PH URINE: 7.5
PROTEIN URINE: NEGATIVE MG/DL
SPECIFIC GRAVITY URINE: 1.02
UROBILINOGEN URINE: 0.2 MG/DL

## 2024-06-24 ENCOUNTER — APPOINTMENT (OUTPATIENT)
Dept: INTERNAL MEDICINE | Facility: CLINIC | Age: 44
End: 2024-06-24
Payer: COMMERCIAL

## 2024-06-24 ENCOUNTER — OUTPATIENT (OUTPATIENT)
Dept: OUTPATIENT SERVICES | Facility: HOSPITAL | Age: 44
LOS: 1 days | End: 2024-06-24
Payer: COMMERCIAL

## 2024-06-24 ENCOUNTER — NON-APPOINTMENT (OUTPATIENT)
Age: 44
End: 2024-06-24

## 2024-06-24 ENCOUNTER — TRANSCRIPTION ENCOUNTER (OUTPATIENT)
Age: 44
End: 2024-06-24

## 2024-06-24 VITALS
HEART RATE: 111 BPM | HEIGHT: 67 IN | TEMPERATURE: 97.8 F | BODY MASS INDEX: 29.19 KG/M2 | WEIGHT: 186 LBS | DIASTOLIC BLOOD PRESSURE: 80 MMHG | SYSTOLIC BLOOD PRESSURE: 130 MMHG | OXYGEN SATURATION: 98 %

## 2024-06-24 VITALS
OXYGEN SATURATION: 97 % | WEIGHT: 185.63 LBS | HEART RATE: 91 BPM | DIASTOLIC BLOOD PRESSURE: 82 MMHG | TEMPERATURE: 97 F | SYSTOLIC BLOOD PRESSURE: 140 MMHG | HEIGHT: 65 IN | RESPIRATION RATE: 20 BRPM

## 2024-06-24 DIAGNOSIS — Z98.890 OTHER SPECIFIED POSTPROCEDURAL STATES: Chronic | ICD-10-CM

## 2024-06-24 DIAGNOSIS — N20.0 CALCULUS OF KIDNEY: ICD-10-CM

## 2024-06-24 DIAGNOSIS — I10 ESSENTIAL (PRIMARY) HYPERTENSION: ICD-10-CM

## 2024-06-24 DIAGNOSIS — Z01.818 ENCOUNTER FOR OTHER PREPROCEDURAL EXAMINATION: ICD-10-CM

## 2024-06-24 DIAGNOSIS — E78.5 HYPERLIPIDEMIA, UNSPECIFIED: ICD-10-CM

## 2024-06-24 DIAGNOSIS — Z29.9 ENCOUNTER FOR PROPHYLACTIC MEASURES, UNSPECIFIED: ICD-10-CM

## 2024-06-24 DIAGNOSIS — Z13.89 ENCOUNTER FOR SCREENING FOR OTHER DISORDER: ICD-10-CM

## 2024-06-24 DIAGNOSIS — R73.03 PREDIABETES.: ICD-10-CM

## 2024-06-24 DIAGNOSIS — K21.9 GASTRO-ESOPHAGEAL REFLUX DISEASE W/OUT ESOPHAGITIS: ICD-10-CM

## 2024-06-24 DIAGNOSIS — Z87.898 PERSONAL HISTORY OF OTHER SPECIFIED CONDITIONS: ICD-10-CM

## 2024-06-24 LAB
A1C WITH ESTIMATED AVERAGE GLUCOSE RESULT: 5.7 % — HIGH (ref 4–5.6)
ANION GAP SERPL CALC-SCNC: 13 MMOL/L — SIGNIFICANT CHANGE UP (ref 5–17)
BASOPHILS # BLD AUTO: 0.03 K/UL — SIGNIFICANT CHANGE UP (ref 0–0.2)
BASOPHILS NFR BLD AUTO: 0.5 % — SIGNIFICANT CHANGE UP (ref 0–2)
BUN SERPL-MCNC: 14 MG/DL — SIGNIFICANT CHANGE UP (ref 8–20)
CALCIUM SERPL-MCNC: 8.8 MG/DL — SIGNIFICANT CHANGE UP (ref 8.4–10.5)
CHLORIDE SERPL-SCNC: 98 MMOL/L — SIGNIFICANT CHANGE UP (ref 96–108)
CO2 SERPL-SCNC: 25 MMOL/L — SIGNIFICANT CHANGE UP (ref 22–29)
CREAT SERPL-MCNC: 1 MG/DL — SIGNIFICANT CHANGE UP (ref 0.5–1.3)
EGFR: 96 ML/MIN/1.73M2 — SIGNIFICANT CHANGE UP
EOSINOPHIL # BLD AUTO: 0.12 K/UL — SIGNIFICANT CHANGE UP (ref 0–0.5)
EOSINOPHIL NFR BLD AUTO: 2.1 % — SIGNIFICANT CHANGE UP (ref 0–6)
ESTIMATED AVERAGE GLUCOSE: 117 MG/DL — HIGH (ref 68–114)
GLUCOSE SERPL-MCNC: 95 MG/DL — SIGNIFICANT CHANGE UP (ref 70–99)
HCT VFR BLD CALC: 47.6 % — SIGNIFICANT CHANGE UP (ref 39–50)
HGB BLD-MCNC: 15.9 G/DL — SIGNIFICANT CHANGE UP (ref 13–17)
IMM GRANULOCYTES NFR BLD AUTO: 0.2 % — SIGNIFICANT CHANGE UP (ref 0–0.9)
LYMPHOCYTES # BLD AUTO: 1.44 K/UL — SIGNIFICANT CHANGE UP (ref 1–3.3)
LYMPHOCYTES # BLD AUTO: 25.7 % — SIGNIFICANT CHANGE UP (ref 13–44)
MCHC RBC-ENTMCNC: 28.1 PG — SIGNIFICANT CHANGE UP (ref 27–34)
MCHC RBC-ENTMCNC: 33.4 GM/DL — SIGNIFICANT CHANGE UP (ref 32–36)
MCV RBC AUTO: 84.2 FL — SIGNIFICANT CHANGE UP (ref 80–100)
MONOCYTES # BLD AUTO: 0.43 K/UL — SIGNIFICANT CHANGE UP (ref 0–0.9)
MONOCYTES NFR BLD AUTO: 7.7 % — SIGNIFICANT CHANGE UP (ref 2–14)
NEUTROPHILS # BLD AUTO: 3.57 K/UL — SIGNIFICANT CHANGE UP (ref 1.8–7.4)
NEUTROPHILS NFR BLD AUTO: 63.8 % — SIGNIFICANT CHANGE UP (ref 43–77)
PLATELET # BLD AUTO: 235 K/UL — SIGNIFICANT CHANGE UP (ref 150–400)
POTASSIUM SERPL-MCNC: 4.3 MMOL/L — SIGNIFICANT CHANGE UP (ref 3.5–5.3)
POTASSIUM SERPL-SCNC: 4.3 MMOL/L — SIGNIFICANT CHANGE UP (ref 3.5–5.3)
RBC # BLD: 5.65 M/UL — SIGNIFICANT CHANGE UP (ref 4.2–5.8)
RBC # FLD: 14.1 % — SIGNIFICANT CHANGE UP (ref 10.3–14.5)
SODIUM SERPL-SCNC: 136 MMOL/L — SIGNIFICANT CHANGE UP (ref 135–145)
WBC # BLD: 5.6 K/UL — SIGNIFICANT CHANGE UP (ref 3.8–10.5)
WBC # FLD AUTO: 5.6 K/UL — SIGNIFICANT CHANGE UP (ref 3.8–10.5)

## 2024-06-24 PROCEDURE — G0463: CPT

## 2024-06-24 PROCEDURE — 83036 HEMOGLOBIN GLYCOSYLATED A1C: CPT

## 2024-06-24 PROCEDURE — 36415 COLL VENOUS BLD VENIPUNCTURE: CPT

## 2024-06-24 PROCEDURE — 85025 COMPLETE CBC W/AUTO DIFF WBC: CPT

## 2024-06-24 PROCEDURE — 99214 OFFICE O/P EST MOD 30 MIN: CPT

## 2024-06-24 PROCEDURE — 80048 BASIC METABOLIC PNL TOTAL CA: CPT

## 2024-06-24 NOTE — ASSESSMENT
[FreeTextEntry1] : Plan -Increase Fluid intake -Anti inflammatory as needed for pain -Continue Flomax -Discussed Right URS, patient agree to proceed  -lemon/lime -Limit salt in diet  -Future 24 hr urine   Patient instructed to go to ER if severe flank pain, fever, chills occur

## 2024-06-24 NOTE — HISTORY OF PRESENT ILLNESS
[FreeTextEntry1] : Hx of Left kidney stones 2 years ago (passed on own at [facility])  Social: Former smoker, Hx drug use  Pt felt severe right stabbing flank pain 3 days ago, went to [facility], UA +microscopic hematuria, Urine culture negative  Started on tamsulosin  Today right flank pain is mild to moderate, intermittent then constant at times. Pt able to control pain with toradol and water for now Denies gross hematuria, pain with urination, fever  CT [facility] 6/18/24 reviewed with patient:  Right ureterolithiasis with mild proximal hydroureteronephrosis. There is a 3 mm calculus within the proximal right ureter associated with mild dilatation of the collecting system and proximal ureter. Bilateral nephrolithiasis. There are 4 calculi in the left kidney measuring up to 3 mm and a single 5 mm calculus within the right kidney. Prostatic enlargement  right URS Risks, benefits and alternatives discussed. Procedure, in hospital stay and recovery explained. Risk of infection, multiple procedures, ureteral injury, ureteral stricture, incomplete stone clearance, sepsis, bleeding, and retention, all discussed.   Plan -Increase Fluid intake -Continue Flomax -Discussed Right URS, patient agree to proceed  -lemon/lime -Limit salt in diet  -Future 24 hr urine

## 2024-06-25 ENCOUNTER — APPOINTMENT (OUTPATIENT)
Dept: UROLOGY | Facility: HOSPITAL | Age: 44
End: 2024-06-25

## 2024-06-25 ENCOUNTER — TRANSCRIPTION ENCOUNTER (OUTPATIENT)
Age: 44
End: 2024-06-25

## 2024-06-25 ENCOUNTER — OUTPATIENT (OUTPATIENT)
Dept: OUTPATIENT SERVICES | Facility: HOSPITAL | Age: 44
LOS: 1 days | End: 2024-06-25
Payer: COMMERCIAL

## 2024-06-25 VITALS
SYSTOLIC BLOOD PRESSURE: 133 MMHG | TEMPERATURE: 99 F | RESPIRATION RATE: 16 BRPM | HEART RATE: 72 BPM | OXYGEN SATURATION: 98 % | HEIGHT: 65 IN | WEIGHT: 185.63 LBS | DIASTOLIC BLOOD PRESSURE: 77 MMHG

## 2024-06-25 VITALS
RESPIRATION RATE: 16 BRPM | HEART RATE: 87 BPM | OXYGEN SATURATION: 100 % | DIASTOLIC BLOOD PRESSURE: 82 MMHG | SYSTOLIC BLOOD PRESSURE: 127 MMHG

## 2024-06-25 DIAGNOSIS — Z98.890 OTHER SPECIFIED POSTPROCEDURAL STATES: Chronic | ICD-10-CM

## 2024-06-25 DIAGNOSIS — N20.0 CALCULUS OF KIDNEY: ICD-10-CM

## 2024-06-25 LAB
ANION GAP SERPL CALC-SCNC: 14 MMOL/L
APPEARANCE: CLEAR
APTT BLD: 29.4 SEC
BACTERIA: NEGATIVE /HPF
BASOPHILS # BLD AUTO: 0.02 K/UL
BASOPHILS NFR BLD AUTO: 0.3 %
BILIRUBIN URINE: NEGATIVE
BLOOD URINE: NEGATIVE
BUN SERPL-MCNC: 13 MG/DL
CALCIUM SERPL-MCNC: 9.6 MG/DL
CAST: 0 /LPF
CHLORIDE SERPL-SCNC: 100 MMOL/L
CO2 SERPL-SCNC: 26 MMOL/L
COLOR: YELLOW
CREAT SERPL-MCNC: 1.23 MG/DL
EGFR: 75 ML/MIN/1.73M2
EOSINOPHIL # BLD AUTO: 0.08 K/UL
EOSINOPHIL NFR BLD AUTO: 1.4 %
EPITHELIAL CELLS: 0 /HPF
ESTIMATED AVERAGE GLUCOSE: 111 MG/DL
GLUCOSE QUALITATIVE U: NEGATIVE MG/DL
GLUCOSE SERPL-MCNC: 76 MG/DL
HBA1C MFR BLD HPLC: 5.5 %
HCT VFR BLD CALC: 49.5 %
HGB BLD-MCNC: 15.6 G/DL
IMM GRANULOCYTES NFR BLD AUTO: 0.3 %
INR PPP: 0.95 RATIO
KETONES URINE: NEGATIVE MG/DL
LEUKOCYTE ESTERASE URINE: ABNORMAL
LYMPHOCYTES # BLD AUTO: 1.4 K/UL
LYMPHOCYTES NFR BLD AUTO: 24.3 %
MAN DIFF?: NORMAL
MCHC RBC-ENTMCNC: 27.6 PG
MCHC RBC-ENTMCNC: 31.5 GM/DL
MCV RBC AUTO: 87.5 FL
MICROSCOPIC-UA: NORMAL
MONOCYTES # BLD AUTO: 0.56 K/UL
MONOCYTES NFR BLD AUTO: 9.7 %
NEUTROPHILS # BLD AUTO: 3.67 K/UL
NEUTROPHILS NFR BLD AUTO: 64 %
NITRITE URINE: NEGATIVE
PH URINE: 6.5
PLATELET # BLD AUTO: 233 K/UL
POTASSIUM SERPL-SCNC: 4.5 MMOL/L
PROTEIN URINE: NORMAL MG/DL
PT BLD: 10.8 SEC
RBC # BLD: 5.66 M/UL
RBC # FLD: 14.6 %
RED BLOOD CELLS URINE: 2 /HPF
SODIUM SERPL-SCNC: 141 MMOL/L
SPECIFIC GRAVITY URINE: 1.02
UROBILINOGEN URINE: 0.2 MG/DL
WBC # FLD AUTO: 5.75 K/UL
WHITE BLOOD CELLS URINE: 0 /HPF

## 2024-06-25 PROCEDURE — C1747: CPT

## 2024-06-25 PROCEDURE — C1889: CPT

## 2024-06-25 PROCEDURE — C1769: CPT

## 2024-06-25 PROCEDURE — 52353 CYSTOURETERO W/LITHOTRIPSY: CPT | Mod: RT

## 2024-06-25 PROCEDURE — 74420 UROGRAPHY RTRGR +-KUB: CPT | Mod: 26,RT

## 2024-06-25 PROCEDURE — C1758: CPT

## 2024-06-25 PROCEDURE — 52356 CYSTO/URETERO W/LITHOTRIPSY: CPT | Mod: RT

## 2024-06-25 DEVICE — URETERAL SHEATH NAVIGATOR HD 11/13FR X 46CM: Type: IMPLANTABLE DEVICE | Site: RIGHT | Status: FUNCTIONAL

## 2024-06-25 DEVICE — URETERAL SHEATH NAVIGATOR 11/13FR X 36CM: Type: IMPLANTABLE DEVICE | Site: RIGHT | Status: FUNCTIONAL

## 2024-06-25 DEVICE — IMPLANTABLE DEVICE: Type: IMPLANTABLE DEVICE | Site: RIGHT | Status: FUNCTIONAL

## 2024-06-25 DEVICE — STONE BASKET ZEROTIP NITINOL 4-WIRE 1.9FR 120CM X 12MM: Type: IMPLANTABLE DEVICE | Site: RIGHT | Status: FUNCTIONAL

## 2024-06-25 DEVICE — GUIDEWIRE SENSOR DUAL-FLEX NITINOL STRAIGHT .035" X 150CM: Type: IMPLANTABLE DEVICE | Site: RIGHT | Status: FUNCTIONAL

## 2024-06-25 DEVICE — URETERAL CATH FLEXIMA OPEN END 5FR 70CM: Type: IMPLANTABLE DEVICE | Site: RIGHT | Status: FUNCTIONAL

## 2024-06-25 DEVICE — LASER FIBER SOLTIVE 200 BALL TIP: Type: IMPLANTABLE DEVICE | Site: RIGHT | Status: FUNCTIONAL

## 2024-06-25 DEVICE — URETERAL CATH DUAL LUMEN 10FR 54CM: Type: IMPLANTABLE DEVICE | Site: RIGHT | Status: FUNCTIONAL

## 2024-06-25 DEVICE — URETEROSCOPE LITHOVUE DISP: Type: IMPLANTABLE DEVICE | Site: RIGHT | Status: FUNCTIONAL

## 2024-06-25 DEVICE — GUIDEWIRE SENSOR NITINOL STRAIGHT .038" X 150CM: Type: IMPLANTABLE DEVICE | Site: RIGHT | Status: FUNCTIONAL

## 2024-06-25 DEVICE — LASER FIBER SOLTIVE 365: Type: IMPLANTABLE DEVICE | Site: RIGHT | Status: FUNCTIONAL

## 2024-06-25 DEVICE — URETERAL CATH AXXCESS OPEN END 6FR 70CM: Type: IMPLANTABLE DEVICE | Site: RIGHT | Status: FUNCTIONAL

## 2024-06-25 DEVICE — LASER FIBER MOSES 200 D/F/L: Type: IMPLANTABLE DEVICE | Site: RIGHT | Status: FUNCTIONAL

## 2024-06-25 RX ORDER — AMLODIPINE BESYLATE 2.5 MG/1
1 TABLET ORAL
Refills: 0 | DISCHARGE

## 2024-06-25 RX ORDER — OMEPRAZOLE 10 MG/1
1 CAPSULE, DELAYED RELEASE ORAL
Refills: 0 | DISCHARGE

## 2024-06-25 RX ORDER — CEFAZOLIN 10 G/1
2000 INJECTION, POWDER, FOR SOLUTION INTRAVENOUS ONCE
Refills: 0 | Status: DISCONTINUED | OUTPATIENT
Start: 2024-06-25 | End: 2024-06-25

## 2024-06-25 RX ORDER — ACETAMINOPHEN 325 MG
1000 TABLET ORAL ONCE
Refills: 0 | Status: DISCONTINUED | OUTPATIENT
Start: 2024-06-25 | End: 2024-06-25

## 2024-06-25 RX ORDER — ONDANSETRON HYDROCHLORIDE 2 MG/ML
4 INJECTION INTRAMUSCULAR; INTRAVENOUS ONCE
Refills: 0 | Status: DISCONTINUED | OUTPATIENT
Start: 2024-06-25 | End: 2024-06-25

## 2024-06-25 RX ORDER — ACETAMINOPHEN 325 MG
2 TABLET ORAL
Qty: 0 | Refills: 0 | DISCHARGE

## 2024-06-25 RX ORDER — PAROXETINE HYDROCHLORIDE 37.5 MG/1
1 TABLET, FILM COATED, EXTENDED RELEASE ORAL
Refills: 0 | DISCHARGE

## 2024-06-25 RX ORDER — ACETAMINOPHEN 325 MG
975 TABLET ORAL ONCE
Refills: 0 | Status: COMPLETED | OUTPATIENT
Start: 2024-06-25 | End: 2024-06-25

## 2024-06-25 RX ORDER — KETOROLAC TROMETHAMINE 30 MG/ML
30 INJECTION, SOLUTION INTRAMUSCULAR ONCE
Refills: 0 | Status: DISCONTINUED | OUTPATIENT
Start: 2024-06-25 | End: 2024-06-25

## 2024-06-25 RX ADMIN — Medication 975 MILLIGRAM(S): at 09:28

## 2024-06-26 ENCOUNTER — NON-APPOINTMENT (OUTPATIENT)
Age: 44
End: 2024-06-26

## 2024-06-27 ENCOUNTER — NON-APPOINTMENT (OUTPATIENT)
Age: 44
End: 2024-06-27

## 2024-06-27 PROBLEM — K21.9 GASTRO-ESOPHAGEAL REFLUX DISEASE WITHOUT ESOPHAGITIS: Chronic | Status: ACTIVE | Noted: 2024-06-24

## 2024-06-27 PROBLEM — E78.5 HYPERLIPIDEMIA, UNSPECIFIED: Chronic | Status: ACTIVE | Noted: 2024-06-24

## 2024-06-27 PROBLEM — F41.9 ANXIETY DISORDER, UNSPECIFIED: Chronic | Status: ACTIVE | Noted: 2024-06-24

## 2024-06-27 PROBLEM — R73.03 PREDIABETES: Chronic | Status: ACTIVE | Noted: 2024-06-24

## 2024-06-27 PROBLEM — I10 ESSENTIAL (PRIMARY) HYPERTENSION: Chronic | Status: ACTIVE | Noted: 2024-06-24

## 2024-06-27 PROBLEM — F10.11 ALCOHOL ABUSE, IN REMISSION: Chronic | Status: ACTIVE | Noted: 2024-06-24

## 2024-06-27 PROBLEM — R94.31 ABNORMAL ELECTROCARDIOGRAM [ECG] [EKG]: Chronic | Status: ACTIVE | Noted: 2024-06-24

## 2024-06-27 PROBLEM — K76.0 FATTY (CHANGE OF) LIVER, NOT ELSEWHERE CLASSIFIED: Chronic | Status: ACTIVE | Noted: 2024-06-24

## 2024-06-27 PROBLEM — Z87.898 PERSONAL HISTORY OF OTHER SPECIFIED CONDITIONS: Chronic | Status: ACTIVE | Noted: 2024-06-24

## 2024-06-29 LAB
KIDNEY STONE SOURCE: NORMAL
NIDUS STONE QN: NORMAL
RESULT COMMENT: NORMAL

## 2024-07-03 ENCOUNTER — APPOINTMENT (OUTPATIENT)
Dept: INTERNAL MEDICINE | Facility: CLINIC | Age: 44
End: 2024-07-03

## 2024-07-03 VITALS
HEIGHT: 67 IN | WEIGHT: 186 LBS | SYSTOLIC BLOOD PRESSURE: 138 MMHG | OXYGEN SATURATION: 98 % | BODY MASS INDEX: 29.19 KG/M2 | DIASTOLIC BLOOD PRESSURE: 84 MMHG | HEART RATE: 73 BPM | TEMPERATURE: 97.8 F

## 2024-07-03 RX ORDER — CEPHALEXIN 500 MG/1
500 CAPSULE ORAL
Refills: 0 | Status: ACTIVE | COMMUNITY

## 2024-07-05 ENCOUNTER — APPOINTMENT (OUTPATIENT)
Dept: UROLOGY | Facility: CLINIC | Age: 44
End: 2024-07-05
Payer: COMMERCIAL

## 2024-07-05 DIAGNOSIS — N20.0 CALCULUS OF KIDNEY: ICD-10-CM

## 2024-07-05 PROCEDURE — 99213 OFFICE O/P EST LOW 20 MIN: CPT

## 2024-08-05 ENCOUNTER — APPOINTMENT (OUTPATIENT)
Dept: INTERNAL MEDICINE | Facility: CLINIC | Age: 44
End: 2024-08-05

## 2024-08-12 ENCOUNTER — APPOINTMENT (OUTPATIENT)
Dept: GASTROENTEROLOGY | Facility: CLINIC | Age: 44
End: 2024-08-12

## 2024-08-13 ENCOUNTER — APPOINTMENT (OUTPATIENT)
Dept: ULTRASOUND IMAGING | Facility: CLINIC | Age: 44
End: 2024-08-13

## 2024-08-23 ENCOUNTER — APPOINTMENT (OUTPATIENT)
Dept: UROLOGY | Facility: CLINIC | Age: 44
End: 2024-08-23

## 2024-09-16 ENCOUNTER — RX RENEWAL (OUTPATIENT)
Age: 44
End: 2024-09-16

## (undated) DEVICE — PORT BIOPSY

## (undated) DEVICE — TUBING SUCTION 20FT

## (undated) DEVICE — VALVE ENDO SURESEAL II 0-5FR

## (undated) DEVICE — GOWN TRIMAX LG

## (undated) DEVICE — TUBING IRR SET FOR CYSTOSCOPY 77"

## (undated) DEVICE — IRR BULB PATHFINDER + 10"

## (undated) DEVICE — GLV 7.5 PROTEXIS (WHITE)

## (undated) DEVICE — WARMING BLANKET UPPER ADULT

## (undated) DEVICE — VENODYNE/SCD SLEEVE CALF MEDIUM

## (undated) DEVICE — SOL IRR BAG H2O 3000ML

## (undated) DEVICE — PRESSURE INFUSOR BAG 3000ML

## (undated) DEVICE — SOL IRR BAG NS 0.9% 3000ML

## (undated) DEVICE — DRAPE C ARM UNIVERSAL

## (undated) DEVICE — VISITEC 4X4

## (undated) DEVICE — Device

## (undated) DEVICE — TUBING TUR 2 PRONG

## (undated) DEVICE — PACK CYSTOSCOPY TIBURON